# Patient Record
Sex: FEMALE | Race: WHITE | NOT HISPANIC OR LATINO | Employment: OTHER | URBAN - METROPOLITAN AREA
[De-identification: names, ages, dates, MRNs, and addresses within clinical notes are randomized per-mention and may not be internally consistent; named-entity substitution may affect disease eponyms.]

---

## 2017-08-03 ENCOUNTER — LAB VISIT (OUTPATIENT)
Dept: LAB | Facility: OTHER | Age: 27
End: 2017-08-03
Attending: OBSTETRICS & GYNECOLOGY
Payer: COMMERCIAL

## 2017-08-03 ENCOUNTER — OFFICE VISIT (OUTPATIENT)
Dept: OBSTETRICS AND GYNECOLOGY | Facility: CLINIC | Age: 27
End: 2017-08-03
Attending: OBSTETRICS & GYNECOLOGY
Payer: COMMERCIAL

## 2017-08-03 VITALS
BODY MASS INDEX: 29.45 KG/M2 | DIASTOLIC BLOOD PRESSURE: 68 MMHG | WEIGHT: 172.5 LBS | HEIGHT: 64 IN | SYSTOLIC BLOOD PRESSURE: 116 MMHG

## 2017-08-03 DIAGNOSIS — N93.8 DUB (DYSFUNCTIONAL UTERINE BLEEDING): ICD-10-CM

## 2017-08-03 DIAGNOSIS — Z12.4 SCREENING FOR CERVICAL CANCER: ICD-10-CM

## 2017-08-03 DIAGNOSIS — Z01.419 ENCOUNTER FOR GYNECOLOGICAL EXAMINATION: Primary | ICD-10-CM

## 2017-08-03 LAB
BASOPHILS # BLD AUTO: 0.01 K/UL
BASOPHILS NFR BLD: 0.2 %
DIFFERENTIAL METHOD: NORMAL
EOSINOPHIL # BLD AUTO: 0.1 K/UL
EOSINOPHIL NFR BLD: 1.6 %
ERYTHROCYTE [DISTWIDTH] IN BLOOD BY AUTOMATED COUNT: 13.4 %
HCT VFR BLD AUTO: 41.2 %
HGB BLD-MCNC: 13.6 G/DL
LYMPHOCYTES # BLD AUTO: 1.9 K/UL
LYMPHOCYTES NFR BLD: 33.5 %
MCH RBC QN AUTO: 28.2 PG
MCHC RBC AUTO-ENTMCNC: 33 G/DL
MCV RBC AUTO: 85 FL
MONOCYTES # BLD AUTO: 0.5 K/UL
MONOCYTES NFR BLD: 8.5 %
NEUTROPHILS # BLD AUTO: 3.3 K/UL
NEUTROPHILS NFR BLD: 56 %
PLATELET # BLD AUTO: 222 K/UL
PMV BLD AUTO: 11.2 FL
RBC # BLD AUTO: 4.83 M/UL
TSH SERPL DL<=0.005 MIU/L-ACNC: 1.38 UIU/ML
WBC # BLD AUTO: 5.79 K/UL

## 2017-08-03 PROCEDURE — 85025 COMPLETE CBC W/AUTO DIFF WBC: CPT

## 2017-08-03 PROCEDURE — 99999 PR PBB SHADOW E&M-EST. PATIENT-LVL III: CPT | Mod: PBBFAC,,, | Performed by: OBSTETRICS & GYNECOLOGY

## 2017-08-03 PROCEDURE — 36415 COLL VENOUS BLD VENIPUNCTURE: CPT

## 2017-08-03 PROCEDURE — 99395 PREV VISIT EST AGE 18-39: CPT | Mod: S$GLB,,, | Performed by: OBSTETRICS & GYNECOLOGY

## 2017-08-03 PROCEDURE — 88175 CYTOPATH C/V AUTO FLUID REDO: CPT

## 2017-08-03 PROCEDURE — 84443 ASSAY THYROID STIM HORMONE: CPT

## 2017-08-03 NOTE — PROGRESS NOTES
Subjective:       Patient ID: Dana Sanz is a 27 y.o. female.    Chief Complaint:  Well Woman (last pap negative 13, pt c/o heavy menses and spotting through out the month for the last 3-4 months, not currently taking any contraceptives )      History of Present Illness.  Dana Sanz is a 27 y.o. female.  She has no breast or urinary symptoms.  She has no oligomenorrhea, bleeding betweeen menses, postcoital bleeding, dysmenorrhea, pelvic pain, dyspareunia, vaginal dryness, vaginal discharge, or sexual complaints.  Her periods have been heavy the last 3-4 months and she is not on OCPs because her  had a vasectomy.    GYN & OB History  Patient's last menstrual period was 07/15/2017 (approximate).   Date of Last Pap: 8/3/2017  Gardasil: Yes    OB History    Para Term  AB Living   3 3 3     3   SAB TAB Ectopic Multiple Live Births           3      # Outcome Date GA Lbr Fabien/2nd Weight Sex Delivery Anes PTL Lv   3 Term 16 38w0d  3.657 kg (8 lb 1 oz) M CS-LTranv Spinal N THANG   2 Term 14 37w0d  3.062 kg (6 lb 12 oz) M CS-LTranv Spinal  THANG   1 Term 13 38w0d  3.856 kg (8 lb 8 oz) M CS-Unspec Spinal  THANG      Obstetric Comments   Age at menarche 12       History reviewed. No pertinent past medical history.  Past Surgical History:   Procedure Laterality Date    APPENDECTOMY  2008     SECTION      x 3    TONSILLECTOMY       Family History   Problem Relation Age of Onset    Hypertension Mother     Liver cancer Maternal Grandmother     Lung cancer Maternal Grandfather     Hypertension Brother     No Known Problems Sister     No Known Problems Brother     Ovarian cancer Neg Hx     Colon cancer Neg Hx     Breast cancer Neg Hx     Diabetes Neg Hx     Stroke Neg Hx      Social History   Substance Use Topics    Smoking status: Never Smoker    Smokeless tobacco: Never Used    Alcohol use Yes      Comment: occasional       Current Outpatient Prescriptions:  "    levonorgestrel (CAIT) 14 mcg/24 hour (3 years) IUD, 1 each by Intrauterine route once., Disp: 1 Intra Uterine Device, Rfl: 0  No current facility-administered medications for this visit.     Review of patient's allergies indicates:  No Known Allergies    Review of Systems  Review of Systems   Constitutional: Negative for fatigue.   HENT: Negative for trouble swallowing.    Eyes: Negative for visual disturbance.   Respiratory: Negative for cough and shortness of breath.    Cardiovascular: Negative for chest pain.   Gastrointestinal: Negative for abdominal distention, abdominal pain, blood in stool, nausea and vomiting.   Genitourinary: Negative for difficulty urinating, dyspareunia, dysuria, flank pain, frequency, hematuria, pelvic pain, urgency, vaginal bleeding, vaginal discharge and vaginal pain.   Musculoskeletal: Negative for arthralgias.   Skin: Negative for rash.   Neurological: Negative for dizziness and headaches.   Psychiatric/Behavioral: Negative for sleep disturbance. The patient is not nervous/anxious.         Objective:     Vitals:    08/03/17 0837   BP: 116/68   Weight: 78.3 kg (172 lb 8.2 oz)   Height: 5' 4" (1.626 m)   PainSc:   2     Body mass index is 29.61 kg/m².      Physical Exam:   Constitutional: She is oriented to person, place, and time. Vital signs are normal. She appears well-developed and well-nourished.    HENT:   Head: Normocephalic.     Neck: Normal range of motion. No thyromegaly present.     Pulmonary/Chest: Right breast exhibits no mass, no nipple discharge, no skin change, no tenderness and no swelling. Left breast exhibits no mass, no nipple discharge, no skin change, no tenderness and no swelling. Breasts are symmetrical.        Abdominal: Soft. Normal appearance and bowel sounds are normal. She exhibits no distension. There is no tenderness.     Genitourinary: Vagina normal and uterus normal. Pelvic exam was performed with patient supine. There is no rash, tenderness, " lesion or injury on the right labia. There is no rash, tenderness, lesion or injury on the left labia. Cervix is normal. Right adnexum displays no mass, no tenderness and no fullness. Left adnexum displays no mass, no tenderness and no fullness. No erythema in the vagina. No vaginal discharge found. Cervix exhibits no motion tenderness and no discharge.           Musculoskeletal: Normal range of motion.      Lymphadenopathy:        Right: No inguinal and no supraclavicular adenopathy present.        Left: No inguinal and no supraclavicular adenopathy present.    Neurological: She is alert and oriented to person, place, and time.    Skin: Skin is warm and dry.    Psychiatric: She has a normal mood and affect.        Assessment/ Plan:     Encounter for gynecological examination    DUB (dysfunctional uterine bleeding)  -     CBC auto differential; Future; Expected date: 08/03/2017  -     TSH; Future; Expected date: 08/03/2017  -     Discontinue: levonorgestrel 14 mcg/24 hour (3 years) IUD 14 mcg; 1 Intra Uterine Device (14 mcg total) by Intrauterine route one time.  -     levonorgestrel (CAIT) 14 mcg/24 hour (3 years) IUD; 1 each by Intrauterine route once.  Dispense: 1 Intra Uterine Device; Refill: 0    Screening for cervical cancer  -     Liquid-based pap smear, screening        Routine pap smears.  Self breast exam  Diet and exercise discussed.  Contraception reviewed/discussed.  Follow-up with me in 1 year.

## 2017-08-04 DIAGNOSIS — N93.8 DUB (DYSFUNCTIONAL UTERINE BLEEDING): ICD-10-CM

## 2017-08-07 ENCOUNTER — TELEPHONE (OUTPATIENT)
Dept: PHARMACY | Facility: CLINIC | Age: 27
End: 2017-08-07

## 2017-08-07 NOTE — TELEPHONE ENCOUNTER
LVM-Hello this is [facility] specialty pharmacy, we have received an order for Jennifer from your provider and will contact you once a benefits investigation is complete with copay information, to set up pickup or shipment, and [facility] consultation.  feel free to give us a call with  any questions prior to hearing back from us at [phone]. thank you!_SANDY 8/7/17

## 2017-08-07 NOTE — TELEPHONE ENCOUNTER
Left voicemail for pt to call back  Wanted to inform pt that her Jennifer is not covered through her pharmacy benefits and that it is being checked under her medical benefits. Once that is complete we will call her with those results and if she chooses to get the Sklya she has to schedule at the [facility] location.

## 2017-08-22 ENCOUNTER — TELEPHONE (OUTPATIENT)
Dept: OBSTETRICS AND GYNECOLOGY | Facility: CLINIC | Age: 27
End: 2017-08-22

## 2017-08-24 ENCOUNTER — PATIENT MESSAGE (OUTPATIENT)
Dept: OBSTETRICS AND GYNECOLOGY | Facility: CLINIC | Age: 27
End: 2017-08-24

## 2017-08-25 ENCOUNTER — PATIENT MESSAGE (OUTPATIENT)
Dept: OBSTETRICS AND GYNECOLOGY | Facility: CLINIC | Age: 27
End: 2017-08-25

## 2017-08-31 ENCOUNTER — OFFICE VISIT (OUTPATIENT)
Dept: OBSTETRICS AND GYNECOLOGY | Facility: CLINIC | Age: 27
End: 2017-08-31
Attending: OBSTETRICS & GYNECOLOGY
Payer: COMMERCIAL

## 2017-08-31 VITALS
HEIGHT: 64 IN | SYSTOLIC BLOOD PRESSURE: 122 MMHG | DIASTOLIC BLOOD PRESSURE: 70 MMHG | BODY MASS INDEX: 29.4 KG/M2 | WEIGHT: 172.19 LBS

## 2017-08-31 DIAGNOSIS — N94.6 DYSMENORRHEA: ICD-10-CM

## 2017-08-31 DIAGNOSIS — N92.0 MENORRHAGIA WITH REGULAR CYCLE: Primary | ICD-10-CM

## 2017-08-31 PROCEDURE — 3008F BODY MASS INDEX DOCD: CPT | Mod: S$GLB,,, | Performed by: OBSTETRICS & GYNECOLOGY

## 2017-08-31 PROCEDURE — 99999 PR PBB SHADOW E&M-EST. PATIENT-LVL II: CPT | Mod: PBBFAC,,, | Performed by: OBSTETRICS & GYNECOLOGY

## 2017-08-31 PROCEDURE — 99213 OFFICE O/P EST LOW 20 MIN: CPT | Mod: S$GLB,,, | Performed by: OBSTETRICS & GYNECOLOGY

## 2017-08-31 NOTE — PROGRESS NOTES
Subjective:      Dana Sanz is a 27 y.o. female who presents for contraception counseling and to discuss heavy periods.  The last 3 periods lasted 5 days but were heavy, soaking through super tampons and with clots.  She also says her cramps are increasing in intensity to 7/10.  She also has had cramps in RLQ and LLQ even when not on period.  The patient is sexually active and her  has a vasectomy.  She says multiple different pills have made her emotional.  Pertinent past medical history: none.  Lab Results   Component Value Date    WBC 5.79 2017    HGB 13.6 2017    HCT 41.2 2017    MCV 85 2017     2017     Lab Results   Component Value Date    TSH 1.383 2017       Menstrual History:  OB History      Para Term  AB Living    3 3 3     3    SAB TAB Ectopic Multiple Live Births            3        Obstetric Comments    Age at menarche 12         Menarche age: 12  Patient's last menstrual period was 2017 (exact date).       History reviewed. No pertinent past medical history.  Past Surgical History:   Procedure Laterality Date    APPENDECTOMY  2008     SECTION      x 3    TONSILLECTOMY  1992     Social History   Substance Use Topics    Smoking status: Never Smoker    Smokeless tobacco: Never Used    Alcohol use Yes      Comment: occasional     Family History   Problem Relation Age of Onset    Hypertension Mother     Liver cancer Maternal Grandmother     Lung cancer Maternal Grandfather     Hypertension Brother     No Known Problems Sister     No Known Problems Brother     Ovarian cancer Neg Hx     Colon cancer Neg Hx     Breast cancer Neg Hx     Diabetes Neg Hx     Stroke Neg Hx      OB History    Para Term  AB Living   3 3 3     3   SAB TAB Ectopic Multiple Live Births           3      # Outcome Date GA Lbr Fabien/2nd Weight Sex Delivery Anes PTL Lv   3 Term 16 38w0d  3.657 kg (8 lb 1 oz) M CS-LTranv  "Spinal N THANG   2 Term 05/11/14 37w0d  3.062 kg (6 lb 12 oz) M CS-LTranv Spinal  THANG   1 Term 07/24/13 38w0d  3.856 kg (8 lb 8 oz) M CS-Unspec Spinal  THANG      Obstetric Comments   Age at menarche 12       Review of Systems:  General: No fever, chills, fatigue or weight loss.  Chest: No chest pain, shortness of breath, or palpitations.  Breast: No pain, masses, or nipple discharge.  Vulva: No pain, lesions, or itching.  Vagina: No relaxation, pain, itching, discharge, or lesions.  Abdomen: No pain, nausea, vomiting, diarrhea, or constipation.  Urinary: No incontinence, nocturia, frequency, or dysuria.  Extremities:  No leg cramps, edema, or calf pain.  Neurologic: No headaches, dizziness, or visual changes.    Vitals:  Vitals:    08/31/17 1535   BP: 122/70   Weight: 78.1 kg (172 lb 2.9 oz)   Height: 5' 4" (1.626 m)   PainSc: 0-No pain     Body mass index is 29.55 kg/m².    Assessment:    27 y.o.with menorrhagia and dysmenorrhea with a h/o moodiness on multiple OCPs -  has vasectomy    Plan:   Contraceptive options reviewed including oral contraceptives, Ortho Evra, Nuvaring, Nexplanon, Mirena, and Jennifer.  Risks/benefits/side effects of each option discussed.  The patient is unsure of the plan because she would only be using for menorrhagia and dysmenorrhea.  We discussed Lysteda also.  Will do pelvic u/s and then she will decide.  I spent 15 minutes face to face with the patient today.  "

## 2017-08-31 NOTE — PROGRESS NOTES
Subjective:       Patient ID: Dana Sanz is a 27 y.o. female.    Chief Complaint:  Contraception (pt would like to discuss contraceptive options)      History of Present Illness.  Dana Sanz is a 27 y.o. female.  She has no breast or urinary symptoms.  She has no menorrhagia, oligomenorrhea, bleeding betweeen menses, postcoital bleeding, dysmenorrhea, pelvic pain, dyspareunia, vaginal dryness, vaginal discharge, or sexual complaints.    GYN & OB History  Patient's last menstrual period was 2017 (exact date).   Date of Last Pap: 2017  Gardasil:***    OB History    Para Term  AB Living   3 3 3     3   SAB TAB Ectopic Multiple Live Births           3      # Outcome Date GA Lbr Fabien/2nd Weight Sex Delivery Anes PTL Lv   3 Term 16 38w0d  3.657 kg (8 lb 1 oz) M CS-LTranv Spinal N THANG   2 Term 14 37w0d  3.062 kg (6 lb 12 oz) M CS-LTranv Spinal  THANG   1 Term 13 38w0d  3.856 kg (8 lb 8 oz) M CS-Unspec Spinal  THANG      Obstetric Comments   Age at menarche 12       History reviewed. No pertinent past medical history.  Past Surgical History:   Procedure Laterality Date    APPENDECTOMY       SECTION      x 3    TONSILLECTOMY       Family History   Problem Relation Age of Onset    Hypertension Mother     Liver cancer Maternal Grandmother     Lung cancer Maternal Grandfather     Hypertension Brother     No Known Problems Sister     No Known Problems Brother     Ovarian cancer Neg Hx     Colon cancer Neg Hx     Breast cancer Neg Hx     Diabetes Neg Hx     Stroke Neg Hx      Social History   Substance Use Topics    Smoking status: Never Smoker    Smokeless tobacco: Never Used    Alcohol use Yes      Comment: occasional     No current outpatient prescriptions on file.    Review of patient's allergies indicates:  No Known Allergies    Review of Systems  Review of Systems   Constitutional: Negative for fatigue.   HENT: Negative for trouble swallowing.   "  Eyes: Negative for visual disturbance.   Respiratory: Negative for cough and shortness of breath.    Cardiovascular: Negative for chest pain.   Gastrointestinal: Negative for abdominal distention, abdominal pain, blood in stool, nausea and vomiting.   Genitourinary: Negative for difficulty urinating, dyspareunia, dysuria, flank pain, frequency, hematuria, pelvic pain, urgency, vaginal bleeding, vaginal discharge and vaginal pain.   Musculoskeletal: Negative for arthralgias.   Skin: Negative for rash.   Neurological: Negative for dizziness and headaches.   Psychiatric/Behavioral: Negative for sleep disturbance. The patient is not nervous/anxious.         Objective:     Vitals:    08/31/17 1535   BP: 122/70   Weight: 78.1 kg (172 lb 2.9 oz)   Height: 5' 4" (1.626 m)   PainSc: 0-No pain     Body mass index is 29.55 kg/m².      Physical Exam     Assessment/ Plan:     There are no diagnoses linked to this encounter.    Routine pap smears.  Self breast exam  Diet and exercise discussed.  Contraception reviewed/discussed.  STD testing discussed and ***.  Follow-up with me in ***.  "

## 2017-10-05 ENCOUNTER — OFFICE VISIT (OUTPATIENT)
Dept: OBSTETRICS AND GYNECOLOGY | Facility: CLINIC | Age: 27
End: 2017-10-05
Payer: COMMERCIAL

## 2017-10-05 VITALS — DIASTOLIC BLOOD PRESSURE: 84 MMHG | HEIGHT: 64 IN | SYSTOLIC BLOOD PRESSURE: 126 MMHG

## 2017-10-05 DIAGNOSIS — N92.0 MENORRHAGIA WITH REGULAR CYCLE: Primary | ICD-10-CM

## 2017-10-05 DIAGNOSIS — N94.6 DYSMENORRHEA: ICD-10-CM

## 2017-10-05 PROCEDURE — 99999 PR PBB SHADOW E&M-EST. PATIENT-LVL II: CPT | Mod: PBBFAC,,, | Performed by: OBSTETRICS & GYNECOLOGY

## 2017-10-05 PROCEDURE — 99213 OFFICE O/P EST LOW 20 MIN: CPT | Mod: S$GLB,,, | Performed by: OBSTETRICS & GYNECOLOGY

## 2017-10-05 RX ORDER — DROSPIRENONE AND ETHINYL ESTRADIOL 0.02-3(28)
1 KIT ORAL DAILY
Qty: 28 TABLET | Refills: 12 | Status: SHIPPED | OUTPATIENT
Start: 2017-10-05 | End: 2018-02-22

## 2017-10-05 NOTE — PROGRESS NOTES
History of Present Illness:  Patient is a 27 y.o. female who complains of menorrhagia and dysmenorrhea.  The last visit she reported the following:  Her last 3 periods lasted 5 days but were heavy, soaking through super tampons and with clots.  Her cramps were a 7/10 on the pain scale.  She also has had cramps in RLQ and LLQ even when not on period.  The patient is sexually active and her  has a vasectomy.  She says multiple different pills have made her emotional. She tried sailaja in the past and thinks she did OK.  She has 3 kids and is working full-time.  She really feels that the moodiness when on the pill is due to the medication.   Pertinent past medical history: none.     Lab Results   Component Value Date    TSH 1.383 2017       Lab Results   Component Value Date    WBC 5.79 2017    HGB 13.6 2017    HCT 41.2 2017    MCV 85 2017     2017      Patient's last menstrual period was 2017 (approximate).    History reviewed. No pertinent past medical history.  Past Surgical History:   Procedure Laterality Date    APPENDECTOMY  2008     SECTION      x 3    TONSILLECTOMY  1992     Family History   Problem Relation Age of Onset    Hypertension Mother     Liver cancer Maternal Grandmother     Lung cancer Maternal Grandfather     Hypertension Brother     No Known Problems Sister     No Known Problems Brother     Ovarian cancer Neg Hx     Colon cancer Neg Hx     Breast cancer Neg Hx     Diabetes Neg Hx     Stroke Neg Hx      Social History     Social History    Marital status:      Spouse name: N/A    Number of children: N/A    Years of education: N/A     Social History Main Topics    Smoking status: Never Smoker    Smokeless tobacco: Never Used    Alcohol use Yes      Comment: occasional    Drug use: No    Sexual activity: Yes     Partners: Male     Birth control/ protection: Partner-Vasectomy      Comment: :  Justyn     Other  "Topics Concern    None     Social History Narrative    None     Review of patient's allergies indicates:  No Known Allergies  No current outpatient prescriptions on file prior to visit.     No current facility-administered medications on file prior to visit.        Review of Systems:  General: No fever, chills, fatigue or weight loss.  Chest: No chest pain, shortness of breath, or palpitations.  Breast: No pain, masses, or nipple discharge.  Vulva: No pain, lesions, or itching.  Vagina: No relaxation, pain, itching, discharge, or lesions.  Abdomen: No pain, nausea, vomiting, diarrhea, or constipation.  Urinary: No incontinence, nocturia, frequency, or dysuria.  Extremities:  No leg cramps, edema, or calf pain.  Neurologic: No headaches, dizziness, or visual changes.    Vitals:    10/05/17 1539   BP: 126/84   Height: 5' 4" (1.626 m)   PainSc: 0-No pain     There is no height or weight on file to calculate BMI.    Assessment and Plan:  Menorrhagia with regular cycle  -     drospirenone-ethinyl estradiol (SHAYY) 3-0.02 mg per tablet; Take 1 tablet by mouth once daily.  Dispense: 28 tablet; Refill: 12    Dysmenorrhea  -     drospirenone-ethinyl estradiol (SHAYY) 3-0.02 mg per tablet; Take 1 tablet by mouth once daily.  Dispense: 28 tablet; Refill: 12    Discussed Lysteda.  We had a long discussion about options including OCP. OE, Nuvaring, IUD, and Nexplanon.  Risks/benefits discussed.  She will try Gianvi and give it 4 months.  We also discussed adding a medication for her mood if it worsens on contraception.  I spent 15 minutes face to face with the patient today.  "

## 2018-02-22 ENCOUNTER — OFFICE VISIT (OUTPATIENT)
Dept: OBSTETRICS AND GYNECOLOGY | Facility: CLINIC | Age: 28
End: 2018-02-22
Payer: COMMERCIAL

## 2018-02-22 VITALS
HEIGHT: 64 IN | BODY MASS INDEX: 27.96 KG/M2 | SYSTOLIC BLOOD PRESSURE: 110 MMHG | DIASTOLIC BLOOD PRESSURE: 80 MMHG | WEIGHT: 163.81 LBS

## 2018-02-22 DIAGNOSIS — N93.8 DUB (DYSFUNCTIONAL UTERINE BLEEDING): ICD-10-CM

## 2018-02-22 DIAGNOSIS — N94.6 DYSMENORRHEA: ICD-10-CM

## 2018-02-22 DIAGNOSIS — F43.23 SITUATIONAL MIXED ANXIETY AND DEPRESSIVE DISORDER: Primary | ICD-10-CM

## 2018-02-22 PROBLEM — N92.0 MENORRHAGIA WITH REGULAR CYCLE: Status: RESOLVED | Noted: 2017-08-31 | Resolved: 2018-02-22

## 2018-02-22 PROCEDURE — 3008F BODY MASS INDEX DOCD: CPT | Mod: S$GLB,,, | Performed by: OBSTETRICS & GYNECOLOGY

## 2018-02-22 PROCEDURE — 99999 PR PBB SHADOW E&M-EST. PATIENT-LVL III: CPT | Mod: PBBFAC,,, | Performed by: OBSTETRICS & GYNECOLOGY

## 2018-02-22 PROCEDURE — 99213 OFFICE O/P EST LOW 20 MIN: CPT | Mod: S$GLB,,, | Performed by: OBSTETRICS & GYNECOLOGY

## 2018-02-22 RX ORDER — DROSPIRENONE AND ETHINYL ESTRADIOL TABLETS 0.02-3(28)
1 KIT ORAL DAILY
Qty: 30 TABLET | Refills: 9 | Status: SHIPPED | OUTPATIENT
Start: 2018-02-22 | End: 2019-03-14

## 2018-02-22 RX ORDER — VENLAFAXINE HYDROCHLORIDE 37.5 MG/1
37.5 CAPSULE, EXTENDED RELEASE ORAL DAILY
Qty: 30 CAPSULE | Refills: 2 | Status: SHIPPED | OUTPATIENT
Start: 2018-02-22 | End: 2019-03-14

## 2018-02-22 RX ORDER — DROSPIRENONE AND ETHINYL ESTRADIOL TABLETS 0.02-3(28)
1 KIT ORAL DAILY
Refills: 12 | COMMUNITY
Start: 2018-02-05 | End: 2018-02-22 | Stop reason: SDUPTHER

## 2018-02-22 NOTE — PROGRESS NOTES
History of Present Illness:  Patient is a 28 y.o. female here for follow-up of heavy, painful periods. The last visit she reported the following:  Her last 3 periods lasted 5 days but were heavy, soaking through super tampons and with clots.  Her cramps were a 7/10 on the pain scale.  She also has had cramps in RLQ and LLQ even when not on period.  The patient is sexually active and her  has a vasectomy.  She says multiple different pills have made her emotional. She tried sailaja in the past and thinks she did OK.  She has 3 kids and is working full-time. Today, she says her cramps are much better on Gianvi.  She has only a very light period, spotting monthly. She is feeling anxious today and crying because her mother had a heart attack in January.  Her mom and sister both have anxiety.  No SI or HI.           Lab Results   Component Value Date     TSH 1.383 08/03/2017               Lab Results   Component Value Date     WBC 5.79 08/03/2017     HGB 13.6 08/03/2017     HCT 41.2 08/03/2017     MCV 85 08/03/2017      08/03/2017     Patient's last menstrual period was 02/17/2018.    No visits with results within 3 Month(s) from this visit.   Latest known visit with results is:   Lab Visit on 08/03/2017   Component Date Value Ref Range Status    WBC 08/03/2017 5.79  3.90 - 12.70 K/uL Final    RBC 08/03/2017 4.83  4.00 - 5.40 M/uL Final    Hemoglobin 08/03/2017 13.6  12.0 - 16.0 g/dL Final    Hematocrit 08/03/2017 41.2  37.0 - 48.5 % Final    MCV 08/03/2017 85  82 - 98 fL Final    MCH 08/03/2017 28.2  27.0 - 31.0 pg Final    MCHC 08/03/2017 33.0  32.0 - 36.0 g/dL Final    RDW 08/03/2017 13.4  11.5 - 14.5 % Final    Platelets 08/03/2017 222  150 - 350 K/uL Final    MPV 08/03/2017 11.2  9.2 - 12.9 fL Final    Gran # (ANC) 08/03/2017 3.3  1.8 - 7.7 K/uL Final    Lymph # 08/03/2017 1.9  1.0 - 4.8 K/uL Final    Mono # 08/03/2017 0.5  0.3 - 1.0 K/uL Final    Eos # 08/03/2017 0.1  0.0 - 0.5 K/uL Final     Baso # 2017 0.01  0.00 - 0.20 K/uL Final    Gran% 2017 56.0  38.0 - 73.0 % Final    Lymph% 2017 33.5  18.0 - 48.0 % Final    Mono% 2017 8.5  4.0 - 15.0 % Final    Eosinophil% 2017 1.6  0.0 - 8.0 % Final    Basophil% 2017 0.2  0.0 - 1.9 % Final    Differential Method 2017 Automated   Final    TSH 2017 1.383  0.400 - 4.000 uIU/mL Final       No past medical history on file.  Past Surgical History:   Procedure Laterality Date    APPENDECTOMY  2008     SECTION      x 3    TONSILLECTOMY  1992     Family History   Problem Relation Age of Onset    Hypertension Mother     Liver cancer Maternal Grandmother     Lung cancer Maternal Grandfather     Hypertension Brother     No Known Problems Sister     No Known Problems Brother     Ovarian cancer Neg Hx     Colon cancer Neg Hx     Breast cancer Neg Hx     Diabetes Neg Hx     Stroke Neg Hx      Social History     Social History    Marital status:      Spouse name: N/A    Number of children: N/A    Years of education: N/A     Social History Main Topics    Smoking status: Never Smoker    Smokeless tobacco: Never Used    Alcohol use Yes      Comment: occasional    Drug use: No    Sexual activity: Yes     Partners: Male     Birth control/ protection: Partner-Vasectomy      Comment: :  Justyn     Other Topics Concern    None     Social History Narrative    None     Review of patient's allergies indicates:  No Known Allergies  Current Outpatient Prescriptions on File Prior to Visit   Medication Sig Dispense Refill    [DISCONTINUED] drospirenone-ethinyl estradiol (SHAYY) 3-0.02 mg per tablet Take 1 tablet by mouth once daily. 28 tablet 12     No current facility-administered medications on file prior to visit.        Review of Systems:  General: No fever, chills, fatigue or weight loss.  Chest: No chest pain, shortness of breath, or palpitations.  Breast: No pain, masses, or nipple  "discharge.  Vulva: No pain, lesions, or itching.  Vagina: No relaxation, pain, itching, discharge, or lesions.  Abdomen: No pain, nausea, vomiting, diarrhea, or constipation.  Urinary: No incontinence, nocturia, frequency, or dysuria.  Extremities:  No leg cramps, edema, or calf pain.  Neurologic: No headaches, dizziness, or visual changes.    Vitals:    02/22/18 0834   BP: 110/80   Weight: 74.3 kg (163 lb 12.8 oz)   Height: 5' 4" (1.626 m)   PainSc: 0-No pain     Body mass index is 28.12 kg/m².     Assessment and Plan:  Situational mixed anxiety and depressive disorder  -     venlafaxine (EFFEXOR XR) 37.5 MG 24 hr capsule; Take 1 capsule (37.5 mg total) by mouth once daily.  Dispense: 30 capsule; Refill: 2    DUB (dysfunctional uterine bleeding)  -     LORYNA, 28, 3-0.02 mg per tablet; Take 1 tablet by mouth once daily.  Dispense: 30 tablet; Refill: 9    Dysmenorrhea  -     LORYNA, 28, 3-0.02 mg per tablet; Take 1 tablet by mouth once daily.  Dispense: 30 tablet; Refill: 9    Walk 30 minutes/day.  Yoga and meditation discussed.  Follow-up 1 month to check on effexor.  Continue Shreya.  I spent 15 minutes face to face with the patient today.  "

## 2019-03-14 ENCOUNTER — OFFICE VISIT (OUTPATIENT)
Dept: OBSTETRICS AND GYNECOLOGY | Facility: CLINIC | Age: 29
End: 2019-03-14
Payer: COMMERCIAL

## 2019-03-14 VITALS
WEIGHT: 165.25 LBS | SYSTOLIC BLOOD PRESSURE: 122 MMHG | BODY MASS INDEX: 28.21 KG/M2 | HEIGHT: 64 IN | DIASTOLIC BLOOD PRESSURE: 72 MMHG

## 2019-03-14 DIAGNOSIS — L70.9 ACNE, UNSPECIFIED ACNE TYPE: Primary | ICD-10-CM

## 2019-03-14 DIAGNOSIS — L65.9 HAIR LOSS: ICD-10-CM

## 2019-03-14 DIAGNOSIS — Z01.411 ENCOUNTER FOR GYNECOLOGICAL EXAMINATION (GENERAL) (ROUTINE) WITH ABNORMAL FINDINGS: ICD-10-CM

## 2019-03-14 DIAGNOSIS — Z00.00 PERIODIC HEALTH ASSESSMENT, GENERAL SCREENING, ADULT: ICD-10-CM

## 2019-03-14 PROCEDURE — 99395 PR PREVENTIVE VISIT,EST,18-39: ICD-10-PCS | Mod: S$GLB,,, | Performed by: OBSTETRICS & GYNECOLOGY

## 2019-03-14 PROCEDURE — 99999 PR PBB SHADOW E&M-EST. PATIENT-LVL III: CPT | Mod: PBBFAC,,, | Performed by: OBSTETRICS & GYNECOLOGY

## 2019-03-14 PROCEDURE — 99999 PR PBB SHADOW E&M-EST. PATIENT-LVL III: ICD-10-PCS | Mod: PBBFAC,,, | Performed by: OBSTETRICS & GYNECOLOGY

## 2019-03-14 PROCEDURE — 99395 PREV VISIT EST AGE 18-39: CPT | Mod: S$GLB,,, | Performed by: OBSTETRICS & GYNECOLOGY

## 2019-03-14 NOTE — PROGRESS NOTES
Subjective:       Patient ID: Dana Sanz is a 29 y.o. female.    Chief Complaint:  Well Woman      History of Present Illness.  Dana Sanz is a 29 y.o. female.  She has no breast or urinary symptoms.  She has no menorrhagia, oligomenorrhea, bleeding betweeen menses, postcoital bleeding, pelvic pain, dyspareunia, vaginal dryness, vaginal discharge, or sexual complaints.  She stopped OCPs in July.  Doesn't like how hormones make her feel.  She complains of acne which she has never had before on her face as well as hair loss on her scalp.  Her periods come monthly, last 5-7 days, and have mild cramps.  Not stressed now.  Not taking Effexor.  Quit job and lives on [facility].  She still has mild dysmenorrhea.  It responded somewhat to Aleve and Advil.    GYN & OB History  Patient's last menstrual period was 2019 (exact date).   Date of Last Pap: 2017  Normal  Gardasil: Yes    OB History    Para Term  AB Living   3 3 3     3   SAB TAB Ectopic Multiple Live Births           3      # Outcome Date GA Lbr Fabien/2nd Weight Sex Delivery Anes PTL Lv   3 Term 16 38w0d  3.657 kg (8 lb 1 oz) M CS-LTranv Spinal N THANG   2 Term 14 37w0d  3.062 kg (6 lb 12 oz) M CS-LTranv Spinal  THANG   1 Term 13 38w0d  3.856 kg (8 lb 8 oz) M CS-Unspec Spinal  THANG      Obstetric Comments   Age at menarche 12       History reviewed. No pertinent past medical history.  Past Surgical History:   Procedure Laterality Date    APPENDECTOMY  2008     SECTION      x 3    TONSILLECTOMY  1992     Family History   Problem Relation Age of Onset    Hypertension Mother     Coronary artery disease Mother     Liver cancer Maternal Grandmother     Lung cancer Maternal Grandfather     Hypertension Brother     No Known Problems Sister     No Known Problems Brother     Ovarian cancer Neg Hx     Colon cancer Neg Hx     Breast cancer Neg Hx     Diabetes Neg Hx     Stroke Neg Hx      Social History  "    Tobacco Use    Smoking status: Never Smoker    Smokeless tobacco: Never Used   Substance Use Topics    Alcohol use: Yes     Comment: occasional    Drug use: No       Current Outpatient Medications:     prenatal vit calc,iron,folic (PRENATAL VITAMIN ORAL), Take by mouth., Disp: , Rfl:     Review of patient's allergies indicates:  No Known Allergies    Review of Systems  Review of Systems   Constitutional: Negative for fatigue.   HENT: Negative for trouble swallowing.    Eyes: Negative for visual disturbance.   Respiratory: Negative for cough and shortness of breath.    Cardiovascular: Negative for chest pain.   Gastrointestinal: Negative for abdominal distention, abdominal pain, blood in stool, nausea and vomiting.   Genitourinary: Negative for difficulty urinating, dyspareunia, dysuria, flank pain, frequency, hematuria, pelvic pain, urgency, vaginal bleeding, vaginal discharge and vaginal pain.   Musculoskeletal: Negative for arthralgias.   Skin: Negative for rash.   Neurological: Negative for dizziness and headaches.   Psychiatric/Behavioral: Negative for sleep disturbance. The patient is not nervous/anxious.         Objective:     Vitals:    03/14/19 1332   BP: 122/72   Weight: 75 kg (165 lb 3.8 oz)   Height: 5' 4" (1.626 m)   PainSc: 0-No pain     Body mass index is 28.36 kg/m².      Physical Exam:   Constitutional: She is oriented to person, place, and time. Vital signs are normal. She appears well-developed and well-nourished.    HENT:   Head: Normocephalic.     Neck: Normal range of motion. No thyromegaly present.     Pulmonary/Chest: Right breast exhibits no mass, no nipple discharge, no skin change, no tenderness and no swelling. Left breast exhibits no mass, no nipple discharge, no skin change, no tenderness and no swelling. Breasts are symmetrical.        Abdominal: Soft. Normal appearance and bowel sounds are normal. She exhibits no distension. There is no tenderness.     Genitourinary: Vagina " normal and uterus normal. Pelvic exam was performed with patient supine. There is no rash, tenderness, lesion or injury on the right labia. There is no rash, tenderness, lesion or injury on the left labia. Cervix is normal. Right adnexum displays no mass, no tenderness and no fullness. Left adnexum displays no mass, no tenderness and no fullness. No erythema in the vagina. No vaginal discharge found. Cervix exhibits no motion tenderness and no discharge.           Musculoskeletal: Normal range of motion.      Lymphadenopathy:        Right: No inguinal and no supraclavicular adenopathy present.        Left: No inguinal and no supraclavicular adenopathy present.    Neurological: She is alert and oriented to person, place, and time.    Skin: Skin is warm and dry.    Psychiatric: She has a normal mood and affect.        Assessment/ Plan:     Acne, unspecified acne type  -     Follicle stimulating hormone; Future  -     Luteinizing hormone; Future  -     TSH; Future  -     Comprehensive metabolic panel; Future  -     Hemoglobin A1c; Future  -     Lipid panel; Future  -     17-Hydroxyprogesterone; Future  -     Testosterone, free; Future  -     Testosterone; Future  -     DHEA-sulfate; Future  -     US Pelvis Comp with Transvag NON-OB (xpd; Future; Expected date: 03/14/2019    Hair loss  -     Follicle stimulating hormone; Future  -     Luteinizing hormone; Future  -     TSH; Future  -     CBC auto differential; Future; Expected date: 03/14/2019  -     17-Hydroxyprogesterone; Future  -     Testosterone, free; Future  -     Testosterone; Future  -     DHEA-sulfate; Future  -     US Pelvis Comp with Transvag NON-OB (xpd; Future; Expected date: 03/14/2019    Periodic health assessment, general screening, adult  -     TSH; Future  -     CBC auto differential; Future; Expected date: 03/14/2019  -     Comprehensive metabolic panel; Future  -     Hemoglobin A1c; Future  -     Lipid panel; Future      We discussed her many  reasons for hair loss, including stress thyroid disease, rapid weight loss, PCOS, etc  Will do labs and ultrasound to rule out PCOS.  We will discuss contraception after her labs and ultrasound were done.  Routine pap smears.  Self breast exam  Diet and exercise discussed.  Contraception reviewed/discussed.  Follow-up with me for ultrasound and plan.

## 2019-03-15 ENCOUNTER — TELEPHONE (OUTPATIENT)
Dept: OBSTETRICS AND GYNECOLOGY | Facility: CLINIC | Age: 29
End: 2019-03-15

## 2019-03-15 DIAGNOSIS — Z00.00 PERIODIC HEALTH ASSESSMENT, GENERAL SCREENING, ADULT: ICD-10-CM

## 2019-03-15 DIAGNOSIS — L65.9 HAIR LOSS: ICD-10-CM

## 2019-03-15 DIAGNOSIS — L70.9 ACNE, UNSPECIFIED ACNE TYPE: Primary | ICD-10-CM

## 2019-03-15 NOTE — TELEPHONE ENCOUNTER
Spoke with pt and she can only go to [facility]. Reentered the labs so she can have them done there on Monday. Informed pt that she can still keep her appointment with Dr. Patel on the 27th. Pt verbalized understanding.

## 2019-03-15 NOTE — TELEPHONE ENCOUNTER
Pt preferred lab is [facility]. Pt is going to try to go to a [facility] on Monday to have labs drawn. Pt wanted me to let you know because she was unsure if you wanted her to keep her appt for u/s on 03/27 or if she would need to move it back.

## 2019-03-22 LAB
17OHP SERPL-MCNC: 103 NG/DL
ALBUMIN SERPL-MCNC: 4.3 G/DL (ref 3.5–5.5)
ALBUMIN/GLOB SERPL: 1.6 {RATIO} (ref 1.2–2.2)
ALP SERPL-CCNC: 73 IU/L (ref 39–117)
ALT SERPL-CCNC: 14 IU/L (ref 0–32)
AST SERPL-CCNC: 12 IU/L (ref 0–40)
BASOPHILS # BLD AUTO: 0 X10E3/UL (ref 0–0.2)
BASOPHILS NFR BLD AUTO: 0 %
BILIRUB SERPL-MCNC: 0.2 MG/DL (ref 0–1.2)
BUN SERPL-MCNC: 12 MG/DL (ref 6–20)
BUN/CREAT SERPL: 18 (ref 9–23)
CALCIUM SERPL-MCNC: 9.3 MG/DL (ref 8.7–10.2)
CHLORIDE SERPL-SCNC: 105 MMOL/L (ref 96–106)
CHOLEST SERPL-MCNC: 159 MG/DL (ref 100–199)
CO2 SERPL-SCNC: 23 MMOL/L (ref 20–29)
CREAT SERPL-MCNC: 0.68 MG/DL (ref 0.57–1)
DHEA-S SERPL-MCNC: 248.6 UG/DL (ref 84.8–378)
EOSINOPHIL # BLD AUTO: 0.1 X10E3/UL (ref 0–0.4)
EOSINOPHIL NFR BLD AUTO: 2 %
ERYTHROCYTE [DISTWIDTH] IN BLOOD BY AUTOMATED COUNT: 14.1 % (ref 12.3–15.4)
FSH SERPL-ACNC: 2.9 MIU/ML
GLOBULIN SER CALC-MCNC: 2.7 G/DL (ref 1.5–4.5)
GLUCOSE SERPL-MCNC: 92 MG/DL (ref 65–99)
HBA1C MFR BLD: 5.2 % (ref 4.8–5.6)
HCT VFR BLD AUTO: 38.3 % (ref 34–46.6)
HDLC SERPL-MCNC: 58 MG/DL
HGB BLD-MCNC: 12.8 G/DL (ref 11.1–15.9)
IMM GRANULOCYTES # BLD AUTO: 0 X10E3/UL (ref 0–0.1)
IMM GRANULOCYTES NFR BLD AUTO: 0 %
LDLC SERPL CALC-MCNC: 89 MG/DL (ref 0–99)
LH SERPL-ACNC: 6 MIU/ML
LYMPHOCYTES # BLD AUTO: 2 X10E3/UL (ref 0.7–3.1)
LYMPHOCYTES NFR BLD AUTO: 30 %
MCH RBC QN AUTO: 28.5 PG (ref 26.6–33)
MCHC RBC AUTO-ENTMCNC: 33.4 G/DL (ref 31.5–35.7)
MCV RBC AUTO: 85 FL (ref 79–97)
MONOCYTES # BLD AUTO: 0.6 X10E3/UL (ref 0.1–0.9)
MONOCYTES NFR BLD AUTO: 9 %
NEUTROPHILS # BLD AUTO: 4 X10E3/UL (ref 1.4–7)
NEUTROPHILS NFR BLD AUTO: 59 %
PLATELET # BLD AUTO: 244 X10E3/UL (ref 150–379)
POTASSIUM SERPL-SCNC: 4.4 MMOL/L (ref 3.5–5.2)
PROT SERPL-MCNC: 7 G/DL (ref 6–8.5)
RBC # BLD AUTO: 4.49 X10E6/UL (ref 3.77–5.28)
SODIUM SERPL-SCNC: 141 MMOL/L (ref 134–144)
TESTOST FREE SERPL-MCNC: 2.1 PG/ML (ref 0–4.2)
TESTOST SERPL-MCNC: 37 NG/DL (ref 8–48)
TRIGL SERPL-MCNC: 59 MG/DL (ref 0–149)
TSH SERPL DL<=0.005 MIU/L-ACNC: 1.8 UIU/ML (ref 0.45–4.5)
VLDLC SERPL CALC-MCNC: 12 MG/DL (ref 5–40)
WBC # BLD AUTO: 6.7 X10E3/UL (ref 3.4–10.8)

## 2019-03-27 ENCOUNTER — OFFICE VISIT (OUTPATIENT)
Dept: OBSTETRICS AND GYNECOLOGY | Facility: CLINIC | Age: 29
End: 2019-03-27
Payer: COMMERCIAL

## 2019-03-27 VITALS — BODY MASS INDEX: 28.36 KG/M2 | HEIGHT: 64 IN

## 2019-03-27 DIAGNOSIS — E28.2 PCOS (POLYCYSTIC OVARIAN SYNDROME): Primary | ICD-10-CM

## 2019-03-27 PROCEDURE — 99214 PR OFFICE/OUTPT VISIT, EST, LEVL IV, 30-39 MIN: ICD-10-PCS | Mod: S$GLB,,, | Performed by: OBSTETRICS & GYNECOLOGY

## 2019-03-27 PROCEDURE — 99214 OFFICE O/P EST MOD 30 MIN: CPT | Mod: S$GLB,,, | Performed by: OBSTETRICS & GYNECOLOGY

## 2019-03-27 PROCEDURE — 3008F PR BODY MASS INDEX (BMI) DOCUMENTED: ICD-10-PCS | Mod: CPTII,S$GLB,, | Performed by: OBSTETRICS & GYNECOLOGY

## 2019-03-27 PROCEDURE — 99999 PR PBB SHADOW E&M-EST. PATIENT-LVL II: ICD-10-PCS | Mod: PBBFAC,,, | Performed by: OBSTETRICS & GYNECOLOGY

## 2019-03-27 PROCEDURE — 99999 PR PBB SHADOW E&M-EST. PATIENT-LVL II: CPT | Mod: PBBFAC,,, | Performed by: OBSTETRICS & GYNECOLOGY

## 2019-03-27 PROCEDURE — 3008F BODY MASS INDEX DOCD: CPT | Mod: CPTII,S$GLB,, | Performed by: OBSTETRICS & GYNECOLOGY

## 2019-03-27 RX ORDER — DROSPIRENONE AND ETHINYL ESTRADIOL 0.02-3(28)
1 KIT ORAL DAILY
Qty: 28 TABLET | Refills: 12 | Status: SHIPPED | OUTPATIENT
Start: 2019-03-27 | End: 2019-04-26

## 2019-03-27 RX ORDER — SPIRONOLACTONE 25 MG/1
25 TABLET ORAL 2 TIMES DAILY
Qty: 60 TABLET | Refills: 11 | Status: SHIPPED | OUTPATIENT
Start: 2019-03-27 | End: 2020-03-05 | Stop reason: SDUPTHER

## 2019-03-27 RX ORDER — METFORMIN HYDROCHLORIDE 500 MG/1
TABLET, EXTENDED RELEASE ORAL
Qty: 120 TABLET | Refills: 11 | Status: SHIPPED | OUTPATIENT
Start: 2019-03-27 | End: 2020-05-24 | Stop reason: SDUPTHER

## 2019-03-27 NOTE — PROGRESS NOTES
Dana Sanz is a 29 y.o. female who presents for follow-up of PCOS.  At her last visit on 3/13/19, she said she stopped OCPs in July.  Doesn't like how hormones make her feel.  She complains of acne which she has never had before on her face as well as hair loss on her scalp.  Her periods come monthly, last 5-7 days, and have mild cramps.  Not stressed now.  Not taking Effexor.  Quit job and lives on [facility].  She still has mild dysmenorrhea.  It responded somewhat to Aleve and Advil.  Today on U/S, she has PCOS appearing ovaries with an endometrial stripe of 6 mm  The following labs are consistent with PCOS elevated LH:FSH ratio and free testosterone of 2.1.    Telephone on 03/15/2019   Component Date Value Ref Range Status    DHEA-SO4 03/18/2019 248.6  84.8 - 378.0 ug/dL Final    Testosterone 03/18/2019 37  8 - 48 ng/dL Final    Testosterone, Free 03/18/2019 2.1  0.0 - 4.2 pg/mL Final    17-OH Progesterone LCMS 03/18/2019 103  ng/dL Final    Cholesterol 03/18/2019 159  100 - 199 mg/dL Final    Triglycerides 03/18/2019 59  0 - 149 mg/dL Final    HDL 03/18/2019 58  >39 mg/dL Final    VLDL Cholesterol [place] 03/18/2019 12  5 - 40 mg/dL Final    LDL Calculated 03/18/2019 89  0 - 99 mg/dL Final    Hemoglobin A1C 03/18/2019 5.2  4.8 - 5.6 % Final    Glucose 03/18/2019 92  65 - 99 mg/dL Final    BUN, Bld 03/18/2019 12  6 - 20 mg/dL Final    Creatinine 03/18/2019 0.68  0.57 - 1.00 mg/dL Final    eGFR if non African American 03/18/2019 119  >59 mL/min/1.73 Final    eGFR if African American 03/18/2019 137  >59 mL/min/1.73 Final    BUN/Creatinine Ratio 03/18/2019 18  9 - 23 Final    Sodium 03/18/2019 141  134 - 144 mmol/L Final    Potassium 03/18/2019 4.4  3.5 - 5.2 mmol/L Final    Chloride 03/18/2019 105  96 - 106 mmol/L Final    CO2 03/18/2019 23  20 - 29 mmol/L Final    Calcium 03/18/2019 9.3  8.7 - 10.2 mg/dL Final    Total Protein 03/18/2019 7.0  6.0 - 8.5 g/dL Final    Albumin 03/18/2019 4.3   3.5 - 5.5 g/dL Final    Globulin, Total 2019 2.7  1.5 - 4.5 g/dL Final    Albumin/Globulin Ratio 2019 1.6  1.2 - 2.2 Final    Total Bilirubin 2019 0.2  0.0 - 1.2 mg/dL Final    Alkaline Phosphatase 2019 73  39 - 117 IU/L Final    AST 2019 12  0 - 40 IU/L Final    ALT 2019 14  0 - 32 IU/L Final    WBC 2019 6.7  3.4 - 10.8 x10E3/uL Final    RBC 2019 4.49  3.77 - 5.28 x10E6/uL Final    Hemoglobin 2019 12.8  11.1 - 15.9 g/dL Final    Hematocrit 2019 38.3  34.0 - 46.6 % Final    MCV 2019 85  79 - 97 fL Final    MCH 2019 28.5  26.6 - 33.0 pg Final    MCHC 2019 33.4  31.5 - 35.7 g/dL Final    RDW 2019 14.1  12.3 - 15.4 % Final    Platelets 2019 244  150 - 379 x10E3/uL Final    Neutrophils 2019 59  Not Estab. % Final    Lymph% 2019 30  Not Estab. % Final    Mono% 2019 9  Not Estab. % Final    Eosinophil% 2019 2  Not Estab. % Final    Basophil% 2019 0  Not Estab. % Final    Neutrophils Absolute 2019 4.0  1.4 - 7.0 x10E3/uL Final    Lymph # 2019 2.0  0.7 - 3.1 x10E3/uL Final    Mono # 2019 0.6  0.1 - 0.9 x10E3/uL Final    Eos # 2019 0.1  0.0 - 0.4 x10E3/uL Final    Baso # 2019 0.0  0.0 - 0.2 x10E3/uL Final    Immature Granulocytes 2019 0  Not Estab. % Final    Immature Grans (Abs) 2019 0.0  0.0 - 0.1 x10E3/uL Final    TSH 2019 1.800  0.450 - 4.500 uIU/mL Final    LH 2019 6.0  mIU/mL Final    FSH 2019 2.9  mIU/mL Final       Menstrual History:  OB History        3    Para   3    Term   3            AB        Living   3       SAB        TAB        Ectopic        Multiple        Live Births   3           Obstetric Comments   Age at menarche 12            Patient's last menstrual period was 2019 (exact date).       Past Medical History:   Diagnosis Date    Relies on partner vasectomy for contraception   "    Past Surgical History:   Procedure Laterality Date    APPENDECTOMY  2008     SECTION  , 2013, 2016    x 3    TONSILLECTOMY  1992     Social History     Tobacco Use    Smoking status: Never Smoker    Smokeless tobacco: Never Used   Substance Use Topics    Alcohol use: Yes     Comment: occasional    Drug use: No     Family History   Problem Relation Age of Onset    Hypertension Mother     Coronary artery disease Mother     Liver cancer Maternal Grandmother     Lung cancer Maternal Grandfather     Hypertension Brother     No Known Problems Sister     No Known Problems Brother     Ovarian cancer Neg Hx     Colon cancer Neg Hx     Breast cancer Neg Hx     Diabetes Neg Hx     Stroke Neg Hx      OB History    Para Term  AB Living   3 3 3     3   SAB TAB Ectopic Multiple Live Births           3      # Outcome Date GA Lbr Fabien/2nd Weight Sex Delivery Anes PTL Lv   3 Term 16 38w0d  3.657 kg (8 lb 1 oz) M CS-LTranv Spinal N THANG      Complications: PROM (premature rupture of membranes), Previous  section   2 Term 14 37w0d  3.062 kg (6 lb 12 oz) M CS-LTranv Spinal  THANG      Complications: Hypertension affecting pregnancy   1 Term 13 38w0d  3.856 kg (8 lb 8 oz) M CS-LTranv Spinal  THANG      Complications: Failure to Progress in Second Stage, Pre-eclampsia      Obstetric Comments   Age at menarche 12       Review of Systems:  General: No fever, chills, fatigue or weight loss.  Chest: No chest pain, shortness of breath, or palpitations.  Breast: No pain, masses, or nipple discharge.  Vulva: No pain, lesions, or itching.  Vagina: No relaxation, pain, itching, discharge, or lesions.  Abdomen: No pain, nausea, vomiting, diarrhea, or constipation.  Urinary: No incontinence, nocturia, frequency, or dysuria.  Extremities:  No leg cramps, edema, or calf pain.  Neurologic: No headaches, dizziness, or visual changes.     Objective:      Ht 5' 4" (1.626 m)   LMP " 03/21/2019 (Exact Date)   BMI 28.36 kg/m²     Assessment & Plan   PCOS (polycystic ovarian syndrome)  -     spironolactone (ALDACTONE) 25 MG tablet; Take 1 tablet (25 mg total) by mouth 2 (two) times daily.  Dispense: 60 tablet; Refill: 11  -     drospirenone-ethinyl estradiol (SHAYY) 3-0.02 mg per tablet; Take 1 tablet by mouth once daily.  Dispense: 28 tablet; Refill: 12  -     metFORMIN (GLUCOPHAGE XR) 500 MG 24 hr tablet; 1 tab at breakfast x 1 month, 1 tab at breakfast & dinner month 2, 2 tabs at breakfast & 1 with dinner month 3, 2 tabs at breakfast & dinner  Dispense: 120 tablet; Refill: 11      Discussed at length the pathophysiology of PCOS and treatment options  Shayy mg QD  Metformin  mg and titrate to 500 mg BID over 1 month.  Spironolactone 25 mg BID.  Discussed thyroid optimization  30 minutes of moderate intensity aerobic exercise 5 days a week and strength training exercises 2-3 times a week.  Low carbohydrate diet  I spent 25 minutes face to face with the patient today.  Follow-up in 4 months.

## 2019-04-15 ENCOUNTER — PATIENT MESSAGE (OUTPATIENT)
Dept: OBSTETRICS AND GYNECOLOGY | Facility: CLINIC | Age: 29
End: 2019-04-15

## 2019-04-16 ENCOUNTER — OFFICE VISIT (OUTPATIENT)
Dept: OBSTETRICS AND GYNECOLOGY | Facility: CLINIC | Age: 29
End: 2019-04-16
Payer: COMMERCIAL

## 2019-04-16 DIAGNOSIS — R30.0 DYSURIA: Primary | ICD-10-CM

## 2019-04-16 DIAGNOSIS — R35.0 INCREASED FREQUENCY OF URINATION: ICD-10-CM

## 2019-04-16 DIAGNOSIS — R39.15 URGENCY OF URINATION: ICD-10-CM

## 2019-04-16 LAB
BILIRUB SERPL-MCNC: NORMAL MG/DL
BLOOD URINE, POC: NORMAL
COLOR, POC UA: YELLOW
GLUCOSE UR QL STRIP: NORMAL
KETONES UR QL STRIP: NORMAL
LEUKOCYTE ESTERASE URINE, POC: NORMAL
NITRITE, POC UA: NORMAL
PH, POC UA: 7
PROTEIN, POC: NORMAL
SPECIFIC GRAVITY, POC UA: 1010
UROBILINOGEN, POC UA: NORMAL

## 2019-04-16 PROCEDURE — 3008F PR BODY MASS INDEX (BMI) DOCUMENTED: ICD-10-PCS | Mod: CPTII,S$GLB,, | Performed by: NURSE PRACTITIONER

## 2019-04-16 PROCEDURE — 3008F BODY MASS INDEX DOCD: CPT | Mod: CPTII,S$GLB,, | Performed by: NURSE PRACTITIONER

## 2019-04-16 PROCEDURE — 99999 PR PBB SHADOW E&M-EST. PATIENT-LVL III: ICD-10-PCS | Mod: PBBFAC,,, | Performed by: NURSE PRACTITIONER

## 2019-04-16 PROCEDURE — 99214 OFFICE O/P EST MOD 30 MIN: CPT | Mod: S$GLB,,, | Performed by: NURSE PRACTITIONER

## 2019-04-16 PROCEDURE — 99214 PR OFFICE/OUTPT VISIT, EST, LEVL IV, 30-39 MIN: ICD-10-PCS | Mod: S$GLB,,, | Performed by: NURSE PRACTITIONER

## 2019-04-16 PROCEDURE — 81002 POCT URINE DIPSTICK WITHOUT MICROSCOPE: ICD-10-PCS | Mod: S$GLB,,, | Performed by: NURSE PRACTITIONER

## 2019-04-16 PROCEDURE — 99999 PR PBB SHADOW E&M-EST. PATIENT-LVL III: CPT | Mod: PBBFAC,,, | Performed by: NURSE PRACTITIONER

## 2019-04-16 PROCEDURE — 81002 URINALYSIS NONAUTO W/O SCOPE: CPT | Mod: S$GLB,,, | Performed by: NURSE PRACTITIONER

## 2019-04-16 RX ORDER — NITROFURANTOIN 25; 75 MG/1; MG/1
100 CAPSULE ORAL 2 TIMES DAILY
Qty: 14 CAPSULE | Refills: 0 | Status: SHIPPED | OUTPATIENT
Start: 2019-04-16 | End: 2019-04-23

## 2019-04-21 LAB
BACTERIA UR CULT: ABNORMAL
BACTERIA UR CULT: ABNORMAL
OTHER ANTIBIOTIC SUSC ISLT: ABNORMAL

## 2019-04-30 VITALS
SYSTOLIC BLOOD PRESSURE: 122 MMHG | WEIGHT: 166.44 LBS | DIASTOLIC BLOOD PRESSURE: 70 MMHG | BODY MASS INDEX: 28.42 KG/M2 | HEIGHT: 64 IN

## 2019-04-30 NOTE — PROGRESS NOTES
Chief Complaint:    Chief Complaint   Patient presents with    Dysuria     Dr Patel   last pap 8/3/17 neg         (Dr. Patel patient)    Last Pap: 8/8/2017     HPI:      Dana Sanz is a 29 y.o. female who presents today with dysuria, nocturia and urgency for the past week or so, since she started taking Spironolactone as prescribed for PCOS.  Symptoms are worsening as far as keeping her up all throughout the night.  The patient denies flank pain, fever, nausea, vomiting, and hematuria. She denies frequent or recurrent UTIs.     She does not complain of vaginal discharge.  She is sexually active.   She declines STD screening today.    LMP:  Patient's last menstrual period was 03/21/2019 (exact date).  She is currently using oral contraceptives (estrogen/progesterone), vasectomy for contraception.    ROS:     GENERAL:  Feeling well overall.  No fever or chills..  ABDOMEN:  No abdominal pain, constipation, diarrhea, nausea, vomiting or rectal bleeding.  No CVA tenderness.  GENITOURINARY:  See HPI.    Physical Exam:      Vitals:    04/16/19 1351   BP: 122/70     Body mass index is 28.57 kg/m².    APPEARANCE:  Well developed, well nourished female in no acute distress..  ABDOMEN:  (--) suprapubic tenderness.  MUSCULOSKELETAL:  No CVA tenderness.  PELVIC: Normal external genitalia without lesions.  Normal hair distribution.  Adequate perineal body, normal urethral meatus.  Vagina moist and well rugated without lesions or discharge.  Cervix pink, without lesions, discharge or tenderness.  No significant cystocele or rectocele.  Bimanual exam shows uterus to be normal size, regular, mobile and non-tender.  Adnexa without masses or tenderness.    Results:      Urine dipstick shows:   negative for all components.           (See full results under LABS in Epic)    Assessment/Plan:     Dysuria  -     nitrofurantoin, macrocrystal-monohydrate, (MACROBID) 100 MG capsule; Take 1 capsule (100 mg total) by mouth 2 (two) times  daily. for 7 days  Dispense: 14 capsule; Refill: 0  -     POCT urine dipstick without microscope  -     Urine culture    Urgency of urination  -     nitrofurantoin, macrocrystal-monohydrate, (MACROBID) 100 MG capsule; Take 1 capsule (100 mg total) by mouth 2 (two) times daily. for 7 days  Dispense: 14 capsule; Refill: 0    Increased frequency of urination  -     nitrofurantoin, macrocrystal-monohydrate, (MACROBID) 100 MG capsule; Take 1 capsule (100 mg total) by mouth 2 (two) times daily. for 7 days  Dispense: 14 capsule; Refill: 0      Counseling:     * Discussed UTI prevention including perineal hygiene, increasing fluids and avoiding caffeine and alcohol, and pelvic rest until symptoms resolve.  * Also discussed signs of pyelonephritis and to go to the ED if develops fever, chills, n/v, back pain, worsening dyuria, hematuria.    * Use of the [employer] Patient Portal discussed and encouraged during today's visit.   * Patient aware she will be notified of any results from today's visit once they have been reviewed by Provider, either via her [facility] patient portal, or telephone call.    Follow-up as needed if no resolution of symptoms.

## 2019-05-01 ENCOUNTER — PATIENT MESSAGE (OUTPATIENT)
Dept: OBSTETRICS AND GYNECOLOGY | Facility: CLINIC | Age: 29
End: 2019-05-01

## 2019-07-23 ENCOUNTER — LAB VISIT (OUTPATIENT)
Dept: LAB | Facility: HOSPITAL | Age: 29
End: 2019-07-23
Attending: OBSTETRICS & GYNECOLOGY
Payer: COMMERCIAL

## 2019-07-23 ENCOUNTER — OFFICE VISIT (OUTPATIENT)
Dept: OBSTETRICS AND GYNECOLOGY | Facility: CLINIC | Age: 29
End: 2019-07-23
Payer: COMMERCIAL

## 2019-07-23 VITALS
DIASTOLIC BLOOD PRESSURE: 82 MMHG | WEIGHT: 167.44 LBS | BODY MASS INDEX: 28.59 KG/M2 | SYSTOLIC BLOOD PRESSURE: 126 MMHG | HEIGHT: 64 IN

## 2019-07-23 DIAGNOSIS — E28.2 PCOS (POLYCYSTIC OVARIAN SYNDROME): ICD-10-CM

## 2019-07-23 DIAGNOSIS — Z13.21 ENCOUNTER FOR VITAMIN DEFICIENCY SCREENING: Primary | ICD-10-CM

## 2019-07-23 DIAGNOSIS — Z13.21 ENCOUNTER FOR VITAMIN DEFICIENCY SCREENING: ICD-10-CM

## 2019-07-23 DIAGNOSIS — R10.32 LLQ PAIN: ICD-10-CM

## 2019-07-23 PROBLEM — L70.9 ACNE: Status: RESOLVED | Noted: 2019-03-14 | Resolved: 2019-07-23

## 2019-07-23 PROBLEM — L65.9 HAIR LOSS: Status: RESOLVED | Noted: 2019-03-14 | Resolved: 2019-07-23

## 2019-07-23 LAB
25(OH)D3+25(OH)D2 SERPL-MCNC: 46 NG/ML (ref 30–96)
BILIRUB SERPL-MCNC: NORMAL MG/DL
BLOOD URINE, POC: NORMAL
COLOR, POC UA: YELLOW
GLUCOSE UR QL STRIP: NORMAL
KETONES UR QL STRIP: NORMAL
LEUKOCYTE ESTERASE URINE, POC: NORMAL
NITRITE, POC UA: NORMAL
PH, POC UA: 7
PROTEIN, POC: NORMAL
SPECIFIC GRAVITY, POC UA: 1
UROBILINOGEN, POC UA: NORMAL
VIT B12 SERPL-MCNC: 600 PG/ML (ref 210–950)

## 2019-07-23 PROCEDURE — 99214 PR OFFICE/OUTPT VISIT, EST, LEVL IV, 30-39 MIN: ICD-10-PCS | Mod: 25,,, | Performed by: OBSTETRICS & GYNECOLOGY

## 2019-07-23 PROCEDURE — 82607 VITAMIN B-12: CPT

## 2019-07-23 PROCEDURE — 3008F BODY MASS INDEX DOCD: CPT | Mod: CPTII,S$GLB,, | Performed by: OBSTETRICS & GYNECOLOGY

## 2019-07-23 PROCEDURE — 3008F PR BODY MASS INDEX (BMI) DOCUMENTED: ICD-10-PCS | Mod: CPTII,S$GLB,, | Performed by: OBSTETRICS & GYNECOLOGY

## 2019-07-23 PROCEDURE — 99999 PR PBB SHADOW E&M-EST. PATIENT-LVL III: CPT | Mod: PBBFAC,,, | Performed by: OBSTETRICS & GYNECOLOGY

## 2019-07-23 PROCEDURE — 81002 URINALYSIS NONAUTO W/O SCOPE: CPT | Mod: S$GLB,,, | Performed by: OBSTETRICS & GYNECOLOGY

## 2019-07-23 PROCEDURE — 81002 POCT URINE DIPSTICK WITHOUT MICROSCOPE: ICD-10-PCS | Mod: S$GLB,,, | Performed by: OBSTETRICS & GYNECOLOGY

## 2019-07-23 PROCEDURE — 82306 VITAMIN D 25 HYDROXY: CPT

## 2019-07-23 PROCEDURE — 99214 OFFICE O/P EST MOD 30 MIN: CPT | Mod: 25,,, | Performed by: OBSTETRICS & GYNECOLOGY

## 2019-07-23 PROCEDURE — 99999 PR PBB SHADOW E&M-EST. PATIENT-LVL III: ICD-10-PCS | Mod: PBBFAC,,, | Performed by: OBSTETRICS & GYNECOLOGY

## 2019-07-23 NOTE — PROGRESS NOTES
"Dana Sanz is a 29 y.o. female who presents for follow-up of PCOS. She is currently taking Sumi QD, Metformin  mg 2 tablets BID, and Spironolactone 25 mg 2 tablets daily.  She also takes Vitamin B12 and D daily but is unsure of dose.  She is having the following side effects: none.   The following symptoms have improved:  Hair loss and acne.  She has occasional break-outs but not bad.  She is not exercising.  She feels she eats "healthy", mainly meat and veggies , fruit, and salad.  She has been having LLQ pain for 3 months.  It worse at night.  No change with certain foods.  It's every day all day.  It's a 4/10.  Doesn't take medicine for it.  Normal bowel function and urination.  In monogamous relationship.  Declines STD testing.        Menstrual History:  OB History        3    Para   3    Term   3            AB        Living   3       SAB        TAB        Ectopic        Multiple        Live Births   3           Obstetric Comments   Age at menarche 12            Patient's last menstrual period was 2019 (approximate).       Past Medical History:   Diagnosis Date    Relies on partner vasectomy for contraception      Past Surgical History:   Procedure Laterality Date    APPENDECTOMY  2008     SECTION  , , 2016    x 3    TONSILLECTOMY  1992     Social History     Tobacco Use    Smoking status: Never Smoker    Smokeless tobacco: Never Used   Substance Use Topics    Alcohol use: Yes     Comment: occasional    Drug use: No     Family History   Problem Relation Age of Onset    Hypertension Mother     Coronary artery disease Mother     Liver cancer Maternal Grandmother     Lung cancer Maternal Grandfather     Hypertension Brother     No Known Problems Sister     No Known Problems Brother     Ovarian cancer Neg Hx     Colon cancer Neg Hx     Breast cancer Neg Hx     Diabetes Neg Hx     Stroke Neg Hx      OB History    Para Term  AB Living   3 " "3 3     3   SAB TAB Ectopic Multiple Live Births           3      # Outcome Date GA Lbr Fabien/2nd Weight Sex Delivery Anes PTL Lv   3 Term 16 38w0d  3.657 kg (8 lb 1 oz) M CS-LTranv Spinal N THANG      Complications: PROM (premature rupture of membranes), Previous  section   2 Term 14 37w0d  3.062 kg (6 lb 12 oz) M CS-LTranv Spinal  THANG      Complications: Hypertension affecting pregnancy   1 Term 13 38w0d  3.856 kg (8 lb 8 oz) M CS-LTranv Spinal  THANG      Complications: Failure to Progress in Second Stage, Pre-eclampsia      Obstetric Comments   Age at menarche 12       Review of Systems:  General: No fever, chills, fatigue or weight loss.  Chest: No chest pain, shortness of breath, or palpitations.  Breast: No pain, masses, or nipple discharge.  Vulva: No pain, lesions, or itching.  Vagina: No relaxation, pain, itching, discharge, or lesions.  Abdomen: No pain, nausea, vomiting, diarrhea, or constipation.  Urinary: No incontinence, nocturia, frequency, or dysuria.  Extremities:  No leg cramps, edema, or calf pain.  Neurologic: No headaches, dizziness, or visual changes.     Objective:     Vitals:    19 1011   BP: 126/82   Weight: 76 kg (167 lb 7 oz)   Height: 5' 4" (1.626 m)   PainSc:   4     Body mass index is 28.74 kg/m².    General:  Well developed, well nourished, and in no apparent distress.  HEENT:  Normal sclera.  No thyromegaly  External genitalia:  No lesions, erythema, rashes, or other abnormalities.  Urethra:  No lesions or discharge.  Vagina:  No lesions, discharge, or tenderness.   Cervix:  No lesions, discharge, or cervical motion tenderness.   Uterus:  Normal in size and shape.  Mobile and nontender.   Adnexa:  No masses or tenderness.    Assessment & Plan:   Encounter for vitamin deficiency screening  -     Vitamin B12; Future; Expected date: 2019  -     Vitamin D; Future; Expected date: 2019    PCOS (polycystic ovarian syndrome)  -     Ambulatory Referral to " [facility])  -     [facility] ([employer])  -     [facility] Patient Entered [facility])    Adult BMI 28.0-28.9 kg/sq m  -     Ambulatory Referral to [facility])  -     [facility] ([employer])  -     [facility] Patient Entered [facility] ([employer])    LLQ pain  -     POCT URINE DIPSTICK WITHOUT MICROSCOPE  -     US Pelvis Comp with Transvag NON-OB (xpd; Future; Expected date: 07/23/2019      Reviewed the pathophysiology of PCOS and treatment options  Continue:   Sumi QD   Metformin  mg 2 tablets BID.   Spironolactone 50 mg QD.   Vitamin D and B12  30 minutes of moderate intensity aerobic exercise 5 days a week and strength training exercises 2-3 times a week.  Low carbohydrate diet.  Referred to [facility]  If ultrasound is normal, recommend trying NSAIDs.  If is still a relief of left lower quadrant pain, recommend GI consult.  I spent 25 minutes face to face with the patient today.  Follow-up in 3 months.

## 2019-08-27 ENCOUNTER — PATIENT MESSAGE (OUTPATIENT)
Dept: OBSTETRICS AND GYNECOLOGY | Facility: CLINIC | Age: 29
End: 2019-08-27

## 2019-08-27 DIAGNOSIS — N83.202 LEFT OVARIAN CYST: Primary | ICD-10-CM

## 2019-10-24 ENCOUNTER — OFFICE VISIT (OUTPATIENT)
Dept: OBSTETRICS AND GYNECOLOGY | Facility: CLINIC | Age: 29
End: 2019-10-24
Payer: COMMERCIAL

## 2019-10-24 VITALS
SYSTOLIC BLOOD PRESSURE: 118 MMHG | WEIGHT: 167 LBS | HEIGHT: 64 IN | BODY MASS INDEX: 28.51 KG/M2 | DIASTOLIC BLOOD PRESSURE: 80 MMHG

## 2019-10-24 DIAGNOSIS — E28.2 PCOS (POLYCYSTIC OVARIAN SYNDROME): Primary | ICD-10-CM

## 2019-10-24 PROCEDURE — 99999 PR PBB SHADOW E&M-EST. PATIENT-LVL III: CPT | Mod: PBBFAC,,, | Performed by: OBSTETRICS & GYNECOLOGY

## 2019-10-24 PROCEDURE — 99214 PR OFFICE/OUTPT VISIT, EST, LEVL IV, 30-39 MIN: ICD-10-PCS | Mod: S$GLB,,, | Performed by: OBSTETRICS & GYNECOLOGY

## 2019-10-24 PROCEDURE — 3008F PR BODY MASS INDEX (BMI) DOCUMENTED: ICD-10-PCS | Mod: CPTII,S$GLB,, | Performed by: OBSTETRICS & GYNECOLOGY

## 2019-10-24 PROCEDURE — 99214 OFFICE O/P EST MOD 30 MIN: CPT | Mod: S$GLB,,, | Performed by: OBSTETRICS & GYNECOLOGY

## 2019-10-24 PROCEDURE — 3008F BODY MASS INDEX DOCD: CPT | Mod: CPTII,S$GLB,, | Performed by: OBSTETRICS & GYNECOLOGY

## 2019-10-24 PROCEDURE — 99999 PR PBB SHADOW E&M-EST. PATIENT-LVL III: ICD-10-PCS | Mod: PBBFAC,,, | Performed by: OBSTETRICS & GYNECOLOGY

## 2019-10-24 NOTE — PROGRESS NOTES
" Dana Sanz is a 29 y.o. female who presents for follow-up of PCOS and a left ovarian cyst.     At her last visit on 19, she said her hair loss and acne had improved.  She has occasional break-outs but not bad.  She was not exercising.  She feels she eats "healthy", mainly meat and veggies , fruit, and salad.  She has been having LLQ pain for 3 months.  It worse at night.  No change with certain foods.  It's every day all day.  It's a 4/10.  Doesn't take medicine for it.  Normal bowel function and urination.  In monogamous relationship.  Declined STD testing.     Today, her LLQ pain is dull and she mainly notices it at night,  Improving.  Cyst has resolved on U/S today.  She is currently taking Sumi QD, Metformin  mg 2 tablets BID, and Spironolactone 25 mg 2 tablets daily, Vitamin B12 1000 mcg QD, and Vitamin D 2000 IU daily. Runs/walks 3 days / week 5-6 miles.  She thinks she eats healthy.  Her main issue is an inability to lose weight.  She does not want to do [facility].    No visits with results within 3 Month(s) from this visit.   Latest known visit with results is:   Lab Visit on 2019   Component Date Value Ref Range Status    Vitamin B-12 2019 600  210 - 950 pg/mL Final    Vit D, 25-Hydroxy 2019 46  30 - 96 ng/mL Final       Menstrual History:  OB History        3    Para   3    Term   3            AB        Living   3       SAB        TAB        Ectopic        Multiple        Live Births   3           Obstetric Comments   Age at menarche 12            No LMP recorded (lmp unknown).       Past Medical History:   Diagnosis Date    Relies on partner vasectomy for contraception      Past Surgical History:   Procedure Laterality Date    APPENDECTOMY  2008     SECTION  , , 2016    x 3    TONSILLECTOMY  1992     Social History     Tobacco Use    Smoking status: Never Smoker    Smokeless tobacco: Never Used   Substance Use Topics    Alcohol " "use: Yes     Comment: occasional    Drug use: No     Family History   Problem Relation Age of Onset    Hypertension Mother     Coronary artery disease Mother     Liver cancer Maternal Grandmother     Lung cancer Maternal Grandfather     Hypertension Brother     No Known Problems Sister     No Known Problems Brother     Ovarian cancer Neg Hx     Colon cancer Neg Hx     Breast cancer Neg Hx     Diabetes Neg Hx     Stroke Neg Hx      OB History    Para Term  AB Living   3 3 3     3   SAB TAB Ectopic Multiple Live Births           3      # Outcome Date GA Lbr Fabien/2nd Weight Sex Delivery Anes PTL Lv   3 Term 16 38w0d  3.657 kg (8 lb 1 oz) M CS-LTranv Spinal N THANG      Complications: PROM (premature rupture of membranes), Previous  section   2 Term 14 37w0d  3.062 kg (6 lb 12 oz) M CS-LTranv Spinal  THANG      Complications: Hypertension affecting pregnancy   1 Term 13 38w0d  3.856 kg (8 lb 8 oz) M CS-LTranv Spinal  THANG      Complications: Failure to Progress in Second Stage, Pre-eclampsia      Obstetric Comments   Age at menarche 12       Review of Systems:  General: No fever, chills, fatigue or weight loss.  Chest: No chest pain, shortness of breath, or palpitations.  Breast: No pain, masses, or nipple discharge.  Vulva: No pain, lesions, or itching.  Vagina: No relaxation, pain, itching, discharge, or lesions.  Abdomen: No pain, nausea, vomiting, diarrhea, or constipation.  Urinary: No incontinence, nocturia, frequency, or dysuria.  Extremities:  No leg cramps, edema, or calf pain.  Neurologic: No headaches, dizziness, or visual changes.     Objective:     Vitals:    10/24/19 0910   BP: 118/80   Weight: 75.8 kg (167 lb)   Height: 5' 4" (1.626 m)   PainSc:   2     Body mass index is 28.67 kg/m².    Assessment & Plan:   PCOS (polycystic ovarian syndrome)      Reviewed the pathophysiology of PCOS and treatment options  Continue:   Sumi QD   Metformin  mg 2 tablets " BID.   Spironolactone 25 mg 2 tablets QD   Vitamin D3 2000 IU daily.   Vitamin B12 1000 mcg QD  Cinnamon and fish oil.  30 minutes of moderate intensity aerobic exercise 5 days a week and strength training exercises 2-3 times a week.  Low carbohydrate diet.  Recommend intermittent fasting.  The Obesity Code discussed.  F/U with gastroenterology for LLQ pain (Could be GI or adhesions related to c/s)  I spent 25 minutes face to face with the patient today.  Follow-up in 3 months.

## 2020-03-05 DIAGNOSIS — E28.2 PCOS (POLYCYSTIC OVARIAN SYNDROME): ICD-10-CM

## 2020-03-05 RX ORDER — SPIRONOLACTONE 25 MG/1
25 TABLET ORAL 2 TIMES DAILY
Qty: 60 TABLET | Refills: 0 | Status: SHIPPED | OUTPATIENT
Start: 2020-03-05 | End: 2020-03-12

## 2020-03-12 ENCOUNTER — OFFICE VISIT (OUTPATIENT)
Dept: OBSTETRICS AND GYNECOLOGY | Facility: CLINIC | Age: 30
End: 2020-03-12
Payer: COMMERCIAL

## 2020-03-12 VITALS
DIASTOLIC BLOOD PRESSURE: 84 MMHG | BODY MASS INDEX: 28.56 KG/M2 | HEIGHT: 64 IN | SYSTOLIC BLOOD PRESSURE: 110 MMHG | WEIGHT: 167.31 LBS

## 2020-03-12 DIAGNOSIS — E28.2 PCOS (POLYCYSTIC OVARIAN SYNDROME): Primary | ICD-10-CM

## 2020-03-12 PROCEDURE — 99999 PR PBB SHADOW E&M-EST. PATIENT-LVL III: ICD-10-PCS | Mod: PBBFAC,,, | Performed by: OBSTETRICS & GYNECOLOGY

## 2020-03-12 PROCEDURE — 3008F PR BODY MASS INDEX (BMI) DOCUMENTED: ICD-10-PCS | Mod: CPTII,S$GLB,, | Performed by: OBSTETRICS & GYNECOLOGY

## 2020-03-12 PROCEDURE — 99214 OFFICE O/P EST MOD 30 MIN: CPT | Mod: S$GLB,,, | Performed by: OBSTETRICS & GYNECOLOGY

## 2020-03-12 PROCEDURE — 3008F BODY MASS INDEX DOCD: CPT | Mod: CPTII,S$GLB,, | Performed by: OBSTETRICS & GYNECOLOGY

## 2020-03-12 PROCEDURE — 99999 PR PBB SHADOW E&M-EST. PATIENT-LVL III: CPT | Mod: PBBFAC,,, | Performed by: OBSTETRICS & GYNECOLOGY

## 2020-03-12 PROCEDURE — 99214 PR OFFICE/OUTPT VISIT, EST, LEVL IV, 30-39 MIN: ICD-10-PCS | Mod: S$GLB,,, | Performed by: OBSTETRICS & GYNECOLOGY

## 2020-03-12 RX ORDER — SPIRONOLACTONE 50 MG/1
50 TABLET, FILM COATED ORAL DAILY
Qty: 30 TABLET | Refills: 11 | Status: SHIPPED | OUTPATIENT
Start: 2020-03-12 | End: 2021-05-05

## 2020-03-12 NOTE — PROGRESS NOTES
" Dana Sanz is a 30 y.o. female who presents for follow-up of PCOS.  At her last visit on 10/24/19, she complained of dull LLQ pain mainly at night and improving.  Cyst had resolved on U/S.  She was taking Sumi QD, Metformin  mg 2 tablets BID, and Spironolactone 25 mg 2 tablets daily, Vitamin B12 1000 mcg QD, and Vitamin D 2000 IU daily. Runs/walks 3 days / week 5-6 miles.  She thought she was eating healthy.  Her main issue was an inability to lose weight.  She did not want to do [facility].     Plan on 10/24/19:  Continue:              Gianvi QD              Metformin  mg 2 tablets BID.              Spironolactone 25 mg 2 tablets QD              Vitamin D3 2000 IU daily              Vitamin B12 1000 mcg QD  Cinnamon and fish oil.  30 minutes of moderate intensity aerobic exercise 5 days a week and strength training exercises 2-3 times a week.  Low carbohydrate diet.  Recommend intermittent fasting.  The Obesity Code discussed.  F/U with gastroenterology for LLQ pain (Could be GI or adhesions related to c/s)    She is doing all of the above meds and supplements. She did not see GI.  Pain only occurs when she eats "unhealthy" things. She is having the following side effects: none.  She is cycling 3-4 days / week 10-12 miles.  She is not doing weight bearing exercises yet.  She has been doing intermittent fasting since October    No visits with results within 3 Month(s) from this visit.   Latest known visit with results is:   Lab Visit on 2019   Component Date Value Ref Range Status    Vitamin B-12 2019 600  210 - 950 pg/mL Final    Vit D, 25-Hydroxy 2019 46  30 - 96 ng/mL Final       Menstrual History:  OB History        3    Para   3    Term   3            AB        Living   3       SAB        TAB        Ectopic        Multiple        Live Births   3           Obstetric Comments   Age at menarche 12            No LMP recorded.       Past Medical History: "   Diagnosis Date    Relies on partner vasectomy for contraception      Past Surgical History:   Procedure Laterality Date    APPENDECTOMY  2008     SECTION  2011, 2013, 2016    x 3    TONSILLECTOMY  1992     Social History     Tobacco Use    Smoking status: Never Smoker    Smokeless tobacco: Never Used   Substance Use Topics    Alcohol use: Yes     Comment: occasional    Drug use: No     Family History   Problem Relation Age of Onset    Hypertension Mother     Coronary artery disease Mother     Liver cancer Maternal Grandmother     Lung cancer Maternal Grandfather     Hypertension Brother     No Known Problems Sister     No Known Problems Brother     Ovarian cancer Neg Hx     Colon cancer Neg Hx     Breast cancer Neg Hx     Diabetes Neg Hx     Stroke Neg Hx      OB History    Para Term  AB Living   3 3 3     3   SAB TAB Ectopic Multiple Live Births           3      # Outcome Date GA Lbr Fabien/2nd Weight Sex Delivery Anes PTL Lv   3 Term 16 38w0d  3.657 kg (8 lb 1 oz) M CS-LTranv Spinal N THANG      Complications: PROM (premature rupture of membranes), Previous  section   2 Term 14 37w0d  3.062 kg (6 lb 12 oz) M CS-LTranv Spinal  THANG      Complications: Hypertension affecting pregnancy   1 Term 13 38w0d  3.856 kg (8 lb 8 oz) M CS-LTranv Spinal  THANG      Complications: Failure to Progress in Second Stage, Pre-eclampsia      Obstetric Comments   Age at menarche 12       Review of Systems:  General: No fever, chills, fatigue or weight loss.  Chest: No chest pain, shortness of breath, or palpitations.  Breast: No pain, masses, or nipple discharge.  Vulva: No pain, lesions, or itching.  Vagina: No relaxation, pain, itching, discharge, or lesions.  Abdomen: No pain, nausea, vomiting, diarrhea, or constipation.  Urinary: No incontinence, nocturia, frequency, or dysuria.  Extremities:  No leg cramps, edema, or calf pain.  Neurologic: No headaches, dizziness, or  "visual changes.     Objective:     Vitals:    03/12/20 1047   BP: 110/84   Weight: 75.9 kg (167 lb 5.3 oz)   Height: 5' 4" (1.626 m)   PainSc: 0-No pain     Body mass index is 28.72 kg/m².    Assessment & Plan:   PCOS (polycystic ovarian syndrome)  -     spironolactone (ALDACTONE) 50 MG tablet; Take 1 tablet (50 mg total) by mouth once daily.  Dispense: 30 tablet; Refill: 11  -     Ambulatory referral/consult to Bariatric Medicine; Future; Expected date: 03/19/2020    BMI 28.0-28.9,adult  -     Ambulatory referral/consult to Bariatric Medicine; Future; Expected date: 03/19/2020      Reviewed the pathophysiology of PCOS and treatment options  Continue current meds and supplements  30 minutes of moderate intensity aerobic exercise 5 days a week and strength training exercises 2-3 times a week.  Low carbohydrate diet - recommend she get Dr. Xie's book on IF  I spent 25 minutes face to face with the patient today.  Follow-up in 3 months for annual and PCOS.    "

## 2020-05-14 ENCOUNTER — PATIENT MESSAGE (OUTPATIENT)
Dept: OBSTETRICS AND GYNECOLOGY | Facility: CLINIC | Age: 30
End: 2020-05-14

## 2020-05-14 DIAGNOSIS — F41.9 ANXIETY: Primary | ICD-10-CM

## 2020-05-14 RX ORDER — ESCITALOPRAM OXALATE 10 MG/1
10 TABLET ORAL DAILY
Qty: 30 TABLET | Refills: 2 | Status: SHIPPED | OUTPATIENT
Start: 2020-05-14 | End: 2020-07-30

## 2020-05-23 DIAGNOSIS — E28.2 PCOS (POLYCYSTIC OVARIAN SYNDROME): ICD-10-CM

## 2020-05-24 RX ORDER — METFORMIN HYDROCHLORIDE 500 MG/1
TABLET, EXTENDED RELEASE ORAL
Qty: 360 TABLET | Refills: 3 | Status: SHIPPED | OUTPATIENT
Start: 2020-05-24 | End: 2021-07-19

## 2020-05-26 ENCOUNTER — PATIENT MESSAGE (OUTPATIENT)
Dept: OBSTETRICS AND GYNECOLOGY | Facility: CLINIC | Age: 30
End: 2020-05-26

## 2020-05-26 NOTE — PROGRESS NOTES
The patient location is: [facility]  Chief complaint leading to consultation is: Anxiety    Visit type: audio only      15 minutes of total time spent on the encounter, which includes face to face time and non-face to face time preparing to see the patient (eg, review of tests), Obtaining and/or reviewing separately obtained history, Documenting clinical information in the electronic or other health record, Independently interpreting results (not separately reported) and communicating results to the patient/family/caregiver, or Care coordination (not separately reported).         Each patient to whom he or she provides medical services by telemedicine is:  (1) informed of the relationship between the physician and patient and the respective role of any other health care provider with respect to management of the patient; and (2) notified that he or she may decline to receive medical services by telemedicine and may withdraw from such care at any time.    Notes:     Spencer Sanz is a 30 y.o. female who presents for follow up of anxiety disorder. She started Lexapro.  Since then, she's having insomnia and night sweats.  She has no libido now either.  She was having crying spells and being very edgy with the kids.  Has been worse since COVID-19.  The Lexapro helped the anxiety.  Current symptoms: feelings of losing control. She denies current suicidal and homicidal ideation. She is walking with her kids every day.  They are financial stressed.  She and  are getting along    The following portions of the patient's history were reviewed and updated as appropriate: allergies, current medications, past family history, past medical history, past social history, past surgical history and problem list.        Assessment:      Anxiety Disorder - improving but doesn't like side effects of Lexapro     Plan:      Medications: Effexor. XR 37.5 mg daily  Exercise

## 2020-05-27 ENCOUNTER — OFFICE VISIT (OUTPATIENT)
Dept: OBSTETRICS AND GYNECOLOGY | Facility: CLINIC | Age: 30
End: 2020-05-27
Payer: COMMERCIAL

## 2020-05-27 DIAGNOSIS — F41.9 ANXIETY: Primary | ICD-10-CM

## 2020-05-27 PROCEDURE — 99213 OFFICE O/P EST LOW 20 MIN: CPT | Mod: 95,,, | Performed by: OBSTETRICS & GYNECOLOGY

## 2020-05-27 PROCEDURE — 99213 PR OFFICE/OUTPT VISIT, EST, LEVL III, 20-29 MIN: ICD-10-PCS | Mod: 95,,, | Performed by: OBSTETRICS & GYNECOLOGY

## 2020-05-27 RX ORDER — VENLAFAXINE HYDROCHLORIDE 37.5 MG/1
37.5 CAPSULE, EXTENDED RELEASE ORAL DAILY
Qty: 30 CAPSULE | Refills: 2 | Status: SHIPPED | OUTPATIENT
Start: 2020-05-27 | End: 2020-07-17

## 2020-07-17 DIAGNOSIS — F41.9 ANXIETY: Primary | ICD-10-CM

## 2020-07-17 RX ORDER — VENLAFAXINE HYDROCHLORIDE 75 MG/1
150 CAPSULE, EXTENDED RELEASE ORAL DAILY
Qty: 30 CAPSULE | Refills: 2 | Status: SHIPPED | OUTPATIENT
Start: 2020-07-17 | End: 2020-09-21 | Stop reason: ALTCHOICE

## 2020-07-30 ENCOUNTER — TELEPHONE (OUTPATIENT)
Dept: OBSTETRICS AND GYNECOLOGY | Facility: CLINIC | Age: 30
End: 2020-07-30

## 2020-07-30 DIAGNOSIS — F41.9 ANXIETY: Primary | ICD-10-CM

## 2020-07-30 RX ORDER — ESCITALOPRAM OXALATE 10 MG/1
10 TABLET ORAL DAILY
Qty: 30 TABLET | Refills: 2 | Status: SHIPPED | OUTPATIENT
Start: 2020-07-30 | End: 2020-12-17

## 2020-07-30 NOTE — TELEPHONE ENCOUNTER
Spoke with pt.  Was better on Lexapro except for sleeep.  Switch back to lexapro 10 mg and titrate melatonin SR 3 mg Q 3 days until sleeping.  F/U in 1 month.  No SI or HI

## 2020-09-01 DIAGNOSIS — E28.2 PCOS (POLYCYSTIC OVARIAN SYNDROME): ICD-10-CM

## 2020-09-02 RX ORDER — SPIRONOLACTONE 25 MG/1
25 TABLET ORAL 2 TIMES DAILY
Qty: 180 TABLET | Refills: 0 | Status: SHIPPED | OUTPATIENT
Start: 2020-09-02 | End: 2020-12-20 | Stop reason: SDUPTHER

## 2020-09-21 ENCOUNTER — OFFICE VISIT (OUTPATIENT)
Dept: OBSTETRICS AND GYNECOLOGY | Facility: CLINIC | Age: 30
End: 2020-09-21
Attending: OBSTETRICS & GYNECOLOGY
Payer: COMMERCIAL

## 2020-09-21 VITALS
HEIGHT: 64 IN | SYSTOLIC BLOOD PRESSURE: 120 MMHG | BODY MASS INDEX: 31.99 KG/M2 | DIASTOLIC BLOOD PRESSURE: 82 MMHG | WEIGHT: 187.38 LBS

## 2020-09-21 DIAGNOSIS — F32.A DEPRESSION, UNSPECIFIED DEPRESSION TYPE: Primary | ICD-10-CM

## 2020-09-21 PROCEDURE — 99213 PR OFFICE/OUTPT VISIT, EST, LEVL III, 20-29 MIN: ICD-10-PCS | Mod: S$GLB,,, | Performed by: OBSTETRICS & GYNECOLOGY

## 2020-09-21 PROCEDURE — 99999 PR PBB SHADOW E&M-EST. PATIENT-LVL III: CPT | Mod: PBBFAC,,, | Performed by: OBSTETRICS & GYNECOLOGY

## 2020-09-21 PROCEDURE — 3008F BODY MASS INDEX DOCD: CPT | Mod: CPTII,S$GLB,, | Performed by: OBSTETRICS & GYNECOLOGY

## 2020-09-21 PROCEDURE — 99213 OFFICE O/P EST LOW 20 MIN: CPT | Mod: S$GLB,,, | Performed by: OBSTETRICS & GYNECOLOGY

## 2020-09-21 PROCEDURE — 99999 PR PBB SHADOW E&M-EST. PATIENT-LVL III: ICD-10-PCS | Mod: PBBFAC,,, | Performed by: OBSTETRICS & GYNECOLOGY

## 2020-09-21 PROCEDURE — 3008F PR BODY MASS INDEX (BMI) DOCUMENTED: ICD-10-PCS | Mod: CPTII,S$GLB,, | Performed by: OBSTETRICS & GYNECOLOGY

## 2020-09-21 RX ORDER — BUPROPION HYDROCHLORIDE 150 MG/1
150 TABLET ORAL EVERY MORNING
Qty: 30 TABLET | Refills: 2 | Status: SHIPPED | OUTPATIENT
Start: 2020-09-21 | End: 2020-12-16

## 2020-09-21 NOTE — PROGRESS NOTES
Subjective:       Dana Sanz is a 30 y.o. female who presents for follow up of depression and anxiety disorder. Effexor made her anxiety worse. So we switched back to Lexapro.  Current symptoms: fatigue, apathy, feeling like she wants to cry.  She feels she has heightened emotions and mood swings. She has gained weight on Lexapro.  She is having hot flashes at night.  She is on Gianvi and doesn't get a period. She denies current suicidal and homicidal ideation    The following portions of the patient's history were reviewed and updated as appropriate: allergies, current medications, past family history, past medical history, past social history, past surgical history and problem list.      Objective:      There were no vitals taken for this visit.   General:  alert, appears stated age and cooperative   Affect/Behavior:  full facial expressions, good grooming, good insight, normal perception, normal reasoning, normal speech pattern and content and normal thought patterns          Assessment:      Anxiety and depression - improving      Plan:      Change to Wellbutrin  mg due to weight gain and inadequate response top Lexapro  F/U 1 month  15 minutes

## 2020-12-17 ENCOUNTER — OFFICE VISIT (OUTPATIENT)
Dept: OBSTETRICS AND GYNECOLOGY | Facility: CLINIC | Age: 30
End: 2020-12-17
Attending: OBSTETRICS & GYNECOLOGY
Payer: COMMERCIAL

## 2020-12-17 VITALS
BODY MASS INDEX: 30.78 KG/M2 | DIASTOLIC BLOOD PRESSURE: 88 MMHG | HEIGHT: 64 IN | WEIGHT: 180.31 LBS | SYSTOLIC BLOOD PRESSURE: 130 MMHG

## 2020-12-17 DIAGNOSIS — Z11.51 SCREENING FOR HUMAN PAPILLOMAVIRUS: ICD-10-CM

## 2020-12-17 DIAGNOSIS — Z12.4 SCREENING FOR MALIGNANT NEOPLASM OF THE CERVIX: Primary | ICD-10-CM

## 2020-12-17 DIAGNOSIS — Z01.419 ENCOUNTER FOR GYNECOLOGICAL EXAMINATION: ICD-10-CM

## 2020-12-17 PROCEDURE — 99395 PREV VISIT EST AGE 18-39: CPT | Mod: S$GLB,,, | Performed by: OBSTETRICS & GYNECOLOGY

## 2020-12-17 PROCEDURE — 87624 HPV HI-RISK TYP POOLED RSLT: CPT

## 2020-12-17 PROCEDURE — 1126F AMNT PAIN NOTED NONE PRSNT: CPT | Mod: S$GLB,,, | Performed by: OBSTETRICS & GYNECOLOGY

## 2020-12-17 PROCEDURE — 3008F PR BODY MASS INDEX (BMI) DOCUMENTED: ICD-10-PCS | Mod: CPTII,S$GLB,, | Performed by: OBSTETRICS & GYNECOLOGY

## 2020-12-17 PROCEDURE — 99395 PR PREVENTIVE VISIT,EST,18-39: ICD-10-PCS | Mod: S$GLB,,, | Performed by: OBSTETRICS & GYNECOLOGY

## 2020-12-17 PROCEDURE — 99999 PR PBB SHADOW E&M-EST. PATIENT-LVL III: CPT | Mod: PBBFAC,,, | Performed by: OBSTETRICS & GYNECOLOGY

## 2020-12-17 PROCEDURE — 1126F PR PAIN SEVERITY QUANTIFIED, NO PAIN PRESENT: ICD-10-PCS | Mod: S$GLB,,, | Performed by: OBSTETRICS & GYNECOLOGY

## 2020-12-17 PROCEDURE — 88175 CYTOPATH C/V AUTO FLUID REDO: CPT

## 2020-12-17 PROCEDURE — 99999 PR PBB SHADOW E&M-EST. PATIENT-LVL III: ICD-10-PCS | Mod: PBBFAC,,, | Performed by: OBSTETRICS & GYNECOLOGY

## 2020-12-17 PROCEDURE — 3008F BODY MASS INDEX DOCD: CPT | Mod: CPTII,S$GLB,, | Performed by: OBSTETRICS & GYNECOLOGY

## 2020-12-17 RX ORDER — LANOLIN ALCOHOL/MO/W.PET/CERES
400 CREAM (GRAM) TOPICAL DAILY
COMMUNITY
End: 2022-02-14

## 2020-12-17 RX ORDER — LIRAGLUTIDE 6 MG/ML
1.2 INJECTION SUBCUTANEOUS DAILY
COMMUNITY
Start: 2020-12-03

## 2020-12-17 RX ORDER — VITAMIN B COMPLEX
1 CAPSULE ORAL DAILY
COMMUNITY

## 2020-12-17 NOTE — PROGRESS NOTES
Subjective:       Patient ID: Dana Sanz is a 30 y.o. female.    Chief Complaint:  No chief complaint on file.      History of Present Illness.  Dana Sanz is a 30 y.o. female.  She has no breast or urinary symptoms.  She has no menorrhagia, oligomenorrhea, bleeding betweeen menses, postcoital bleeding, dysmenorrhea, pelvic pain, dyspareunia, vaginal dryness, vaginal discharge, or sexual complaints.  Doing well on Gianvi.  She has been on Victoza since  for hyperinsulinemia and she lost 7#.    GYN & OB History  No LMP recorded (lmp unknown). (Menstrual status: Birth Control).   Last Pap: No result found  Gardasil:  Yes    OB History    Para Term  AB Living   3 3 3     3   SAB TAB Ectopic Multiple Live Births           3      # Outcome Date GA Lbr Fabien/2nd Weight Sex Delivery Anes PTL Lv   3 Term 16 38w0d  3.657 kg (8 lb 1 oz) M CS-LTranv Spinal N THANG      Complications: PROM (premature rupture of membranes), Previous  section   2 Term 14 37w0d  3.062 kg (6 lb 12 oz) M CS-LTranv Spinal  THANG      Complications: Hypertension affecting pregnancy   1 Term 13 38w0d  3.856 kg (8 lb 8 oz) M CS-LTranv Spinal  THANG      Complications: Failure to Progress in Second Stage, Pre-eclampsia      Obstetric Comments   Age at menarche 12       Past Medical History:   Diagnosis Date    Hyperinsulinemia     Hyperlipidemia     PCOS (polycystic ovarian syndrome)     Relies on partner vasectomy for contraception      Past Surgical History:   Procedure Laterality Date    APPENDECTOMY  2008     SECTION  , , 2016    x 3    TONSILLECTOMY  1992     Family History   Problem Relation Age of Onset    Hypertension Mother     Coronary artery disease Mother     Liver cancer Maternal Grandmother     Lung cancer Maternal Grandfather     Hypertension Brother     No Known Problems Sister     No Known Problems Brother     Ovarian cancer Neg Hx     Colon cancer Neg Hx      Breast cancer Neg Hx     Diabetes Neg Hx     Stroke Neg Hx      Social History     Tobacco Use    Smoking status: Never Smoker    Smokeless tobacco: Never Used   Substance Use Topics    Alcohol use: Yes     Comment: occasional    Drug use: No       Current Outpatient Medications:     b complex vitamins capsule, Take 1 capsule by mouth once daily., Disp: , Rfl:     ergocalciferol, vitamin D2, (VITAMIN D ORAL), Take by mouth., Disp: , Rfl:     GIANVI, 28, 3-0.02 mg per tablet, TAKE 1 TABLET BY MOUTH EVERY DAY, Disp: 84 tablet, Rfl: 0    magnesium oxide (MAG-OX) 400 mg (241.3 mg magnesium) tablet, Take 400 mg by mouth once daily., Disp: , Rfl:     metFORMIN (GLUCOPHAGE-XR) 500 MG XR 24hr tablet, TAKE 2 TABLETS AT BREAKFAST & 2 TABLETS AT DINNER, Disp: 360 tablet, Rfl: 3    spironolactone (ALDACTONE) 50 MG tablet, Take 1 tablet (50 mg total) by mouth once daily., Disp: 30 tablet, Rfl: 11    VICTOZA 2-CHANO 0.6 mg/0.1 mL (18 mg/3 mL) PnIj pen, INJECT 1.2 MG SUBCUTANEOUSLY AS DIRECTED INJECT 1.2 MG SUBCUTANEOUSLY EVERY DAY, Disp: , Rfl:     cyanocobalamin, vitamin B-12, (VITAMIN B-12 ORAL), Take by mouth., Disp: , Rfl:     spironolactone (ALDACTONE) 25 MG tablet, Take 1 tablet (25 mg total) by mouth 2 (two) times daily. (Patient not taking: Reported on 12/17/2020), Disp: 180 tablet, Rfl: 0    Review of patient's allergies indicates:  No Known Allergies    Review of Systems  Review of Systems   Constitutional: Negative for fatigue.   HENT: Negative for trouble swallowing.    Eyes: Negative for visual disturbance.   Respiratory: Negative for cough and shortness of breath.    Cardiovascular: Negative for chest pain.   Gastrointestinal: Negative for abdominal distention, abdominal pain, blood in stool, nausea and vomiting.   Genitourinary: Negative for difficulty urinating, dyspareunia, dysuria, flank pain, frequency, hematuria, pelvic pain, urgency, vaginal bleeding, vaginal discharge and vaginal pain.  "  Musculoskeletal: Negative for arthralgias.   Skin: Negative for rash.   Neurological: Negative for dizziness and headaches.   Psychiatric/Behavioral: Negative for sleep disturbance. The patient is not nervous/anxious.         Objective:     Vitals:    12/17/20 1044   BP: 130/88   Weight: 81.8 kg (180 lb 5.4 oz)   Height: 5' 4" (1.626 m)   PainSc: 0-No pain     Body mass index is 30.95 kg/m².      Physical Exam:   Constitutional: She is oriented to person, place, and time. She appears well-developed and well-nourished.    HENT:   Head: Normocephalic.     Neck: Normal range of motion. No thyromegaly present.     Pulmonary/Chest: Right breast exhibits no mass, no nipple discharge, no skin change, no tenderness and no swelling. Left breast exhibits no mass, no nipple discharge, no skin change, no tenderness and no swelling. Breasts are symmetrical.        Abdominal: Soft. Normal appearance and bowel sounds are normal. She exhibits no distension. There is no abdominal tenderness.     Genitourinary:    Vagina and uterus normal.      Pelvic exam was performed with patient supine.   There is no rash, tenderness, lesion or injury on the right labia. There is no rash, tenderness, lesion or injury on the left labia. Cervix is normal. Right adnexum displays no mass, no tenderness and no fullness. Left adnexum displays no mass, no tenderness and no fullness. No erythema in the vagina. Cervix exhibits no motion tenderness and no discharge. negative for vaginal discharge          Musculoskeletal: Normal range of motion.      Lymphadenopathy:        Right: No supraclavicular adenopathy present.        Left: No supraclavicular adenopathy present.    Neurological: She is alert and oriented to person, place, and time.    Skin: Skin is warm and dry.    Psychiatric: She has a normal mood and affect.        Assessment/ Plan:     Screening for malignant neoplasm of the cervix  -     Liquid-Based Pap Smear, Screening    Screening for " human papillomavirus  -     HPV High Risk Genotypes, PCR    Encounter for gynecological examination      Armolipid Plus discussed  Routine pap smears.  Self breast exam  Diet and exercise discussed.  Contraception reviewed/discussed.  Follow-up with me in 1 year.

## 2020-12-23 LAB
HPV HR 12 DNA SPEC QL NAA+PROBE: NEGATIVE
HPV16 AG SPEC QL: NEGATIVE
HPV18 DNA SPEC QL NAA+PROBE: NEGATIVE

## 2020-12-30 ENCOUNTER — HOSPITAL ENCOUNTER (EMERGENCY)
Facility: HOSPITAL | Age: 30
Discharge: HOME | End: 2020-12-30
Attending: EMERGENCY MEDICINE
Payer: COMMERCIAL

## 2020-12-30 VITALS
HEIGHT: 64 IN | OXYGEN SATURATION: 98 % | RESPIRATION RATE: 18 BRPM | SYSTOLIC BLOOD PRESSURE: 136 MMHG | BODY MASS INDEX: 29.71 KG/M2 | HEART RATE: 90 BPM | DIASTOLIC BLOOD PRESSURE: 86 MMHG | WEIGHT: 174 LBS | TEMPERATURE: 99 F

## 2020-12-30 DIAGNOSIS — R10.9 ABDOMINAL PAIN, UNSPECIFIED ABDOMINAL LOCATION: Primary | ICD-10-CM

## 2020-12-30 DIAGNOSIS — R10.9 ABDOMINAL PAIN: ICD-10-CM

## 2020-12-30 LAB
ALBUMIN SERPL BCP-MCNC: 4.3 G/DL (ref 3.5–5.2)
ALP SERPL-CCNC: 53 U/L (ref 55–135)
ALT SERPL W/O P-5'-P-CCNC: 50 U/L (ref 10–44)
ANION GAP SERPL CALC-SCNC: 10 MMOL/L (ref 8–16)
AST SERPL-CCNC: 26 U/L (ref 10–40)
B-HCG UR QL: NEGATIVE
BASOPHILS # BLD AUTO: 0.03 K/UL (ref 0–0.2)
BASOPHILS NFR BLD: 0.4 % (ref 0–1.9)
BILIRUB SERPL-MCNC: 0.7 MG/DL (ref 0.1–1)
BILIRUB UR QL STRIP: NEGATIVE
BUN SERPL-MCNC: 9 MG/DL (ref 6–20)
CALCIUM SERPL-MCNC: 9.7 MG/DL (ref 8.7–10.5)
CHLORIDE SERPL-SCNC: 105 MMOL/L (ref 95–110)
CLARITY UR: CLEAR
CO2 SERPL-SCNC: 24 MMOL/L (ref 23–29)
COLOR UR: COLORLESS
CREAT SERPL-MCNC: 0.8 MG/DL (ref 0.5–1.4)
CTP QC/QA: YES
DIFFERENTIAL METHOD: NORMAL
EOSINOPHIL # BLD AUTO: 0.1 K/UL (ref 0–0.5)
EOSINOPHIL NFR BLD: 0.9 % (ref 0–8)
ERYTHROCYTE [DISTWIDTH] IN BLOOD BY AUTOMATED COUNT: 12.6 % (ref 11.5–14.5)
EST. GFR  (AFRICAN AMERICAN): >60 ML/MIN/1.73 M^2
EST. GFR  (NON AFRICAN AMERICAN): >60 ML/MIN/1.73 M^2
GLUCOSE SERPL-MCNC: 93 MG/DL (ref 70–110)
GLUCOSE UR QL STRIP: NEGATIVE
HCT VFR BLD AUTO: 41.1 % (ref 37–48.5)
HGB BLD-MCNC: 13.6 G/DL (ref 12–16)
HGB UR QL STRIP: NEGATIVE
IMM GRANULOCYTES # BLD AUTO: 0.03 K/UL (ref 0–0.04)
IMM GRANULOCYTES NFR BLD AUTO: 0.4 % (ref 0–0.5)
KETONES UR QL STRIP: NEGATIVE
LEUKOCYTE ESTERASE UR QL STRIP: NEGATIVE
LYMPHOCYTES # BLD AUTO: 2.2 K/UL (ref 1–4.8)
LYMPHOCYTES NFR BLD: 32.1 % (ref 18–48)
MCH RBC QN AUTO: 28.8 PG (ref 27–31)
MCHC RBC AUTO-ENTMCNC: 33.1 G/DL (ref 32–36)
MCV RBC AUTO: 87 FL (ref 82–98)
MONOCYTES # BLD AUTO: 0.5 K/UL (ref 0.3–1)
MONOCYTES NFR BLD: 7.3 % (ref 4–15)
NEUTROPHILS # BLD AUTO: 4 K/UL (ref 1.8–7.7)
NEUTROPHILS NFR BLD: 58.9 % (ref 38–73)
NITRITE UR QL STRIP: NEGATIVE
NRBC BLD-RTO: 0 /100 WBC
PH UR STRIP: 7 [PH] (ref 5–8)
PLATELET # BLD AUTO: 251 K/UL (ref 150–350)
PMV BLD AUTO: 10.5 FL (ref 9.2–12.9)
POTASSIUM SERPL-SCNC: 4.1 MMOL/L (ref 3.5–5.1)
PROT SERPL-MCNC: 8.1 G/DL (ref 6–8.4)
PROT UR QL STRIP: NEGATIVE
RBC # BLD AUTO: 4.72 M/UL (ref 4–5.4)
SARS-COV-2 RDRP RESP QL NAA+PROBE: NEGATIVE
SODIUM SERPL-SCNC: 139 MMOL/L (ref 136–145)
SP GR UR STRIP: 1 (ref 1–1.03)
URN SPEC COLLECT METH UR: ABNORMAL
UROBILINOGEN UR STRIP-ACNC: NEGATIVE EU/DL
WBC # BLD AUTO: 6.83 K/UL (ref 3.9–12.7)

## 2020-12-30 PROCEDURE — 85025 COMPLETE CBC W/AUTO DIFF WBC: CPT

## 2020-12-30 PROCEDURE — 80053 COMPREHEN METABOLIC PANEL: CPT

## 2020-12-30 PROCEDURE — 36415 COLL VENOUS BLD VENIPUNCTURE: CPT

## 2020-12-30 PROCEDURE — U0002 COVID-19 LAB TEST NON-CDC: HCPCS

## 2020-12-30 PROCEDURE — 25500020 PHARM REV CODE 255: Performed by: EMERGENCY MEDICINE

## 2020-12-30 PROCEDURE — 96375 TX/PRO/DX INJ NEW DRUG ADDON: CPT

## 2020-12-30 PROCEDURE — 63600175 PHARM REV CODE 636 W HCPCS: Performed by: EMERGENCY MEDICINE

## 2020-12-30 PROCEDURE — 81025 URINE PREGNANCY TEST: CPT | Performed by: NURSE PRACTITIONER

## 2020-12-30 PROCEDURE — 96374 THER/PROPH/DIAG INJ IV PUSH: CPT

## 2020-12-30 PROCEDURE — 99285 EMERGENCY DEPT VISIT HI MDM: CPT | Mod: 25

## 2020-12-30 PROCEDURE — 81003 URINALYSIS AUTO W/O SCOPE: CPT

## 2020-12-30 RX ORDER — ONDANSETRON 2 MG/ML
4 INJECTION INTRAMUSCULAR; INTRAVENOUS
Status: COMPLETED | OUTPATIENT
Start: 2020-12-30 | End: 2020-12-30

## 2020-12-30 RX ORDER — DICYCLOMINE HYDROCHLORIDE 20 MG/1
20 TABLET ORAL EVERY 8 HOURS PRN
Qty: 20 TABLET | Refills: 0 | Status: SHIPPED | OUTPATIENT
Start: 2020-12-30 | End: 2021-01-08

## 2020-12-30 RX ORDER — KETOROLAC TROMETHAMINE 30 MG/ML
15 INJECTION, SOLUTION INTRAMUSCULAR; INTRAVENOUS
Status: COMPLETED | OUTPATIENT
Start: 2020-12-30 | End: 2020-12-30

## 2020-12-30 RX ADMIN — KETOROLAC TROMETHAMINE 15 MG: 30 INJECTION, SOLUTION INTRAMUSCULAR; INTRAVENOUS at 07:12

## 2020-12-30 RX ADMIN — ONDANSETRON 4 MG: 2 INJECTION INTRAMUSCULAR; INTRAVENOUS at 07:12

## 2020-12-30 RX ADMIN — IOHEXOL 100 ML: 350 INJECTION, SOLUTION INTRAVENOUS at 07:12

## 2020-12-30 NOTE — FIRST PROVIDER EVALUATION
"Medical screening exam completed.  I have conducted a focused provider triage encounter, findings are as follows:    Brief history of present illness:  Left lower quadrant pain  Pt has history of PCOS and ovarian cyst  Onset 2 days ago pt reports N/D without emesis     Vitals:    12/30/20 1650   BP: (!) 140/95   BP Location: Left arm   Patient Position: Sitting   Pulse: 89   Resp: 18   Temp: 99 °F (37.2 °C)   TempSrc: Oral   SpO2: 100%   Weight: 78.9 kg (174 lb)   Height: 5' 4" (1.626 m)       Pertinent physical exam:  pain left lower quadrant pain with palpation    Brief workup plan:  abdomen work up with US    Preliminary workup initiated; this workup will be continued and followed by the physician or advanced practice provider that is assigned to the patient when roomed.  "

## 2020-12-31 NOTE — ED PROVIDER NOTES
Encounter Date: 2020       History     Chief Complaint   Patient presents with    Abdominal Pain     LLQ abd pain x 4 days, worsening symptoms     This is a 30-year-old female who presents complaining of left lower quadrant abdominal pain.  Symptoms have been present over the past 4 days and have worsened recently.  She does report having a history of chronic abdominal pain.  She states that symptoms are worse than previous episodes.  She states she has had chronic symptoms now for several years and has had previous ovarian cyst.  She denies fever or chills.  She denies vomiting but has had nausea.  The pain is not worse with movement but is worse with palpation.  She denies any associated dysuria frequency urgency or vaginal discharge.  She is sexually active with 1 partner only and denies any history of STDs.  Symptoms have been moderate intensity with no exacerbating or alleviating factors.  She denies any other problems or complaints.        Review of patient's allergies indicates:  No Known Allergies  Past Medical History:   Diagnosis Date    Hyperinsulinemia     Hyperlipidemia     PCOS (polycystic ovarian syndrome)     Relies on partner vasectomy for contraception      Past Surgical History:   Procedure Laterality Date    APPENDECTOMY  2008     SECTION  , , 2016    x 3    TONSILLECTOMY  1992     Family History   Problem Relation Age of Onset    Hypertension Mother     Coronary artery disease Mother     Liver cancer Maternal Grandmother     Lung cancer Maternal Grandfather     Hypertension Brother     No Known Problems Sister     No Known Problems Brother     Ovarian cancer Neg Hx     Colon cancer Neg Hx     Breast cancer Neg Hx     Diabetes Neg Hx     Stroke Neg Hx      Social History     Tobacco Use    Smoking status: Never Smoker    Smokeless tobacco: Never Used   Substance Use Topics    Alcohol use: Yes     Comment: occasional    Drug use: No     Review of  Systems   Constitutional: Negative.  Negative for activity change, appetite change, chills, fatigue and fever.   HENT: Negative.  Negative for congestion, dental problem, ear pain, rhinorrhea, sinus pressure, sinus pain, sore throat and trouble swallowing.    Eyes: Negative.  Negative for photophobia, pain, redness and visual disturbance.   Respiratory: Negative.  Negative for cough, chest tightness, shortness of breath and wheezing.    Cardiovascular: Negative.  Negative for chest pain, palpitations and leg swelling.   Gastrointestinal: Positive for abdominal pain and nausea. Negative for abdominal distention, anal bleeding, blood in stool, constipation, diarrhea and vomiting.   Endocrine: Negative.    Genitourinary: Negative.  Negative for decreased urine volume, difficulty urinating, dysuria, flank pain, frequency, hematuria, pelvic pain, urgency, vaginal bleeding, vaginal discharge and vaginal pain.   Musculoskeletal: Negative.  Negative for arthralgias, back pain, gait problem, joint swelling, myalgias, neck pain and neck stiffness.   Skin: Negative.  Negative for color change, pallor and rash.   Neurological: Negative.  Negative for dizziness, tremors, seizures, syncope, facial asymmetry, speech difficulty, weakness, light-headedness, numbness and headaches.   Hematological: Negative.  Does not bruise/bleed easily.   Psychiatric/Behavioral: Negative.  Negative for confusion.   All other systems reviewed and are negative.      Physical Exam     Initial Vitals [12/30/20 1650]   BP Pulse Resp Temp SpO2   (!) 140/95 89 18 99 °F (37.2 °C) 100 %      MAP       --         Physical Exam    Nursing note and vitals reviewed.  Constitutional: She is active and cooperative.  Non-toxic appearance. She does not have a sickly appearance. She does not appear ill. No distress.   HENT:   Head: Normocephalic and atraumatic.   Right Ear: Tympanic membrane normal.   Left Ear: Tympanic membrane normal.   Nose: Nose normal.    Mouth/Throat: Uvula is midline, oropharynx is clear and moist and mucous membranes are normal. No oral lesions. No uvula swelling. No oropharyngeal exudate, posterior oropharyngeal edema or posterior oropharyngeal erythema.   Eyes: Conjunctivae, EOM and lids are normal. Pupils are equal, round, and reactive to light. No scleral icterus.   Neck: Trachea normal, normal range of motion, full passive range of motion without pain and phonation normal. Neck supple. No thyroid mass present. No stridor present. No spinous process tenderness and no muscular tenderness present. No edema, no erythema and normal range of motion present. No neck rigidity. No JVD present.   Cardiovascular: Normal rate, regular rhythm, normal heart sounds, intact distal pulses and normal pulses. Exam reveals no gallop and no friction rub.    No murmur heard.  Pulmonary/Chest: Effort normal and breath sounds normal. No accessory muscle usage. No tachypnea. No respiratory distress. She has no wheezes. She has no rhonchi. She has no rales.   Abdominal: Soft. Normal appearance and bowel sounds are normal. She exhibits no distension, no pulsatile midline mass and no mass. There is no hepatosplenomegaly. There is abdominal tenderness in the left lower quadrant. There is no rigidity, no rebound, no guarding, no CVA tenderness, no tenderness at McBurney's point and negative Davis's sign.   Musculoskeletal: Normal range of motion. No tenderness or edema.      Comments: Pulses are 2+ throughout, cap refill is less than 2 sec throughout, extremities are nontender throughout with full range of motion. There is no spinal tenderness to palpation.   Neurological: She is alert and oriented to person, place, and time. She has normal strength. She displays normal reflexes. No cranial nerve deficit or sensory deficit.   No focal deficits.   Skin: Skin is warm, dry and intact. Capillary refill takes less than 2 seconds. No ecchymosis, no petechiae and no rash  noted. No erythema. No pallor.   Psychiatric: She has a normal mood and affect. Her speech is normal and behavior is normal. Judgment and thought content normal. Cognition and memory are normal.         ED Course   Procedures  Labs Reviewed   COMPREHENSIVE METABOLIC PANEL - Abnormal; Notable for the following components:       Result Value    Alkaline Phosphatase 53 (*)     ALT 50 (*)     All other components within normal limits   URINALYSIS, REFLEX TO URINE CULTURE - Abnormal; Notable for the following components:    Color, UA Colorless (*)     All other components within normal limits    Narrative:     In and Out Cath as needed it patient unable to void  Specimen Source->Urine   CBC W/ AUTO DIFFERENTIAL   SARS-COV-2 RNA AMPLIFICATION, QUAL   POCT URINE PREGNANCY          Imaging Results          CT Abdomen Pelvis With Contrast (Final result)  Result time 12/30/20 19:15:00    Final result by Gregorio Elias MD (12/30/20 19:15:00)                 Narrative:    EXAM DESCRIPTION: CT ABDOMEN PELVIS WITH CONTRAST 12/30/2020 7:15 PM CST    CLINICAL HISTORY: 30 years Female, Abdominal infection suspected; ;    COMPARISON:  None.    Technical factors: This exam was performed according to our departmental dose-optimization program, which includes automated exposure control, adjustment of the mA and/or kV according to patient size and/or use of iterative reconstruction technique.    Images were obtained after the administration of 100 mL of Omnipaque 350 intravenous contrast.    FINDINGS:  Limited evaluation of the lower chest reveals clear lung bases.    Liver, spleen, pancreas, gallbladder, and both adrenal glands appear normal.    Subcentimeter low-density lesion is noted about the interpolar region of the right kidney, too small to accurately characterize. Both kidneys otherwise enhance symmetrically. No hydronephrosis or hydroureter. Urinary bladder is well distended and shows no suspicious finding.    Uterus and both  ovaries show no suspicious abnormality either.    Small and large bowel are normal in caliber. No evidence of bowel obstruction. Appendix is not visualized.    Vascular structures opacify with contrast normally. No suspicious lymphadenopathy or drainable fluid collections are appreciated.    Bone windows show no destructive osseous lesions.    IMPRESSION:  No acute abnormality within the abdomen or pelvis.    Electronically signed by:  Gregorio Elias MD  12/30/2020 8:29 PM CST Workstation: [id number]                             US Pelvis Complete Non OB (Final result)  Result time 12/30/20 18:35:57   Procedure changed from US Transvaginal Non OB     Final result by Kasi Roe MD (12/30/20 18:35:57)                 Narrative:    HISTORY: Acute left-sided pelvic pain, negative urine pregnancy test.    FINDINGS: Transabdominal pelvic ultrasound was performed, with  comparison to multiple prior exams. The uterus is anteverted,  measuring 9 cm in length, with normal echotexture. The echogenic  endometrial complex is normal measuring 2 mm maximum thickness. There  is no endometrial mass or fluid collection demonstrated.    Both ovaries are normal in size and echotexture, and have normal color  and spectral Doppler vascular flow. The right ovary measures 3.6 x 2.7  x 1.8 cm, with the left ovary measuring 2.7 x 2.4 x 1.5 cm.    There are no adnexal masses or pelvic free fluid demonstrated. The  urinary bladder is normal.    IMPRESSION: Negative pelvic ultrasound.    Electronically Signed by Kasi CORDOVA on 12/30/2020 6:55 PM                               Medical Decision Making:   Clinical Tests:   Lab Tests: Reviewed  Radiological Study: Reviewed  Medical Tests: Reviewed  ED Management:  Patient opted to not have a pelvic exam while in the emergency room.  She has been hemodynamically stable.  CT scan is negative for evidence of acute intra-abdominal abnormality.  Pelvic ultrasound does not demonstrate evidence of  current ovarian cyst or cyst rupture.  Have explained the importance of close outpatient evaluation with her physician.  Have also arranged an appointment with [facility].  Return precautions have been discussed in detail and need for close follow-up has been discussed.                             Clinical Impression:       ICD-10-CM ICD-9-CM   1. Abdominal pain, unspecified abdominal location  R10.9 789.00   2. Abdominal pain  R10.9 789.00                          ED Disposition Condition    Discharge Stable        ED Prescriptions     Medication Sig Dispense Start Date End Date Auth. Provider    dicyclomine (BENTYL) 20 mg tablet Take 1 tablet (20 mg total) by mouth every 8 (eight) hours as needed (abdominal cramping). 20 tablet 12/30/2020 1/8/2021 Temitope Jacob MD        Follow-up Information     Follow up With Specialties Details Why Contact Info    [facility]  Schedule an appointment as soon as possible for a visit in 1 day  [address]  [place] [address]  [phone]                                       Temitope Jacob MD  12/31/20 7060

## 2020-12-31 NOTE — ED NOTES
Pt states pain feels worse after US, ECP aware. Safety precautions in place. Call light within reach. Will continue to provide care accordingly. .

## 2020-12-31 NOTE — DISCHARGE INSTRUCTIONS
Please read and follow discharge instructions and return precautions.  Important to see your primary care physician or [facility] primary care in the next 1-4 days.  Tylenol or ibuprofen over-the-counter as directed if needed for pain.  Bentyl as directed if needed for abdominal cramping.  Keep well hydrated--drink 8 glasses of water daily.  Follow a bland diet.  Attempt use of a heating pad as well.  Return immediately if you develop new or worsening symptoms or if you have any new problems or concerns.

## 2020-12-31 NOTE — ED NOTES
Pt updated on POC. Pt denies further needs at this time. Safety precautions in place. Call light within reach. Will continue to provide care accordingly.

## 2021-01-26 LAB
FINAL PATHOLOGIC DIAGNOSIS: NORMAL
Lab: NORMAL

## 2021-11-16 ENCOUNTER — TELEPHONE (OUTPATIENT)
Dept: SURGERY | Facility: CLINIC | Age: 31
End: 2021-11-16
Payer: COMMERCIAL

## 2021-11-17 ENCOUNTER — OFFICE VISIT (OUTPATIENT)
Dept: SURGERY | Facility: CLINIC | Age: 31
End: 2021-11-17
Payer: COMMERCIAL

## 2021-11-17 VITALS — SYSTOLIC BLOOD PRESSURE: 133 MMHG | HEART RATE: 71 BPM | DIASTOLIC BLOOD PRESSURE: 93 MMHG | TEMPERATURE: 98 F

## 2021-11-17 DIAGNOSIS — K80.20 SYMPTOMATIC CHOLELITHIASIS: Primary | ICD-10-CM

## 2021-11-17 DIAGNOSIS — Z01.818 PRE-OP TESTING: ICD-10-CM

## 2021-11-17 PROCEDURE — 1159F MED LIST DOCD IN RCRD: CPT | Mod: CPTII,S$GLB,, | Performed by: SURGERY

## 2021-11-17 PROCEDURE — 99203 PR OFFICE/OUTPT VISIT, NEW, LEVL III, 30-44 MIN: ICD-10-PCS | Mod: S$GLB,,, | Performed by: SURGERY

## 2021-11-17 PROCEDURE — 99203 OFFICE O/P NEW LOW 30 MIN: CPT | Mod: S$GLB,,, | Performed by: SURGERY

## 2021-11-17 PROCEDURE — 3075F SYST BP GE 130 - 139MM HG: CPT | Mod: CPTII,S$GLB,, | Performed by: SURGERY

## 2021-11-17 PROCEDURE — 3080F PR MOST RECENT DIASTOLIC BLOOD PRESSURE >= 90 MM HG: ICD-10-PCS | Mod: CPTII,S$GLB,, | Performed by: SURGERY

## 2021-11-17 PROCEDURE — 3080F DIAST BP >= 90 MM HG: CPT | Mod: CPTII,S$GLB,, | Performed by: SURGERY

## 2021-11-17 PROCEDURE — 3075F PR MOST RECENT SYSTOLIC BLOOD PRESS GE 130-139MM HG: ICD-10-PCS | Mod: CPTII,S$GLB,, | Performed by: SURGERY

## 2021-11-17 PROCEDURE — 1159F PR MEDICATION LIST DOCUMENTED IN MEDICAL RECORD: ICD-10-PCS | Mod: CPTII,S$GLB,, | Performed by: SURGERY

## 2021-11-17 RX ORDER — PEN NEEDLE, DIABETIC 32 GX 1/6"
NEEDLE, DISPOSABLE MISCELLANEOUS
COMMUNITY
Start: 2021-11-13

## 2021-11-17 RX ORDER — DICYCLOMINE HYDROCHLORIDE 10 MG/1
10 CAPSULE ORAL
COMMUNITY
Start: 2021-10-22

## 2021-11-17 RX ORDER — FLUTICASONE PROPIONATE 50 MCG
1 SPRAY, SUSPENSION (ML) NASAL DAILY
COMMUNITY
Start: 2021-09-15

## 2021-11-18 ENCOUNTER — HOSPITAL ENCOUNTER (OUTPATIENT)
Dept: PREADMISSION TESTING | Facility: HOSPITAL | Age: 31
Discharge: HOME | End: 2021-11-18
Attending: SURGERY
Payer: COMMERCIAL

## 2021-11-18 VITALS — WEIGHT: 142 LBS | BODY MASS INDEX: 24.24 KG/M2 | HEIGHT: 64 IN

## 2021-11-18 DIAGNOSIS — Z01.818 PREOP TESTING: Primary | ICD-10-CM

## 2021-11-18 LAB
ANION GAP SERPL CALC-SCNC: 11 MMOL/L (ref 8–16)
BASOPHILS # BLD AUTO: 0.02 K/UL (ref 0–0.2)
BASOPHILS NFR BLD: 0.4 % (ref 0–1.9)
BUN SERPL-MCNC: 7 MG/DL (ref 6–20)
CALCIUM SERPL-MCNC: 9.7 MG/DL (ref 8.7–10.5)
CHLORIDE SERPL-SCNC: 103 MMOL/L (ref 95–110)
CO2 SERPL-SCNC: 23 MMOL/L (ref 23–29)
CREAT SERPL-MCNC: 0.9 MG/DL (ref 0.5–1.4)
DIFFERENTIAL METHOD: NORMAL
EOSINOPHIL # BLD AUTO: 0.1 K/UL (ref 0–0.5)
EOSINOPHIL NFR BLD: 1.9 % (ref 0–8)
ERYTHROCYTE [DISTWIDTH] IN BLOOD BY AUTOMATED COUNT: 12.5 % (ref 11.5–14.5)
EST. GFR  (AFRICAN AMERICAN): >60 ML/MIN/1.73 M^2
EST. GFR  (NON AFRICAN AMERICAN): >60 ML/MIN/1.73 M^2
GLUCOSE SERPL-MCNC: 96 MG/DL (ref 70–110)
HCT VFR BLD AUTO: 39.5 % (ref 37–48.5)
HGB BLD-MCNC: 13 G/DL (ref 12–16)
IMM GRANULOCYTES # BLD AUTO: 0.02 K/UL (ref 0–0.04)
IMM GRANULOCYTES NFR BLD AUTO: 0.4 % (ref 0–0.5)
LYMPHOCYTES # BLD AUTO: 2.1 K/UL (ref 1–4.8)
LYMPHOCYTES NFR BLD: 39.2 % (ref 18–48)
MCH RBC QN AUTO: 28.6 PG (ref 27–31)
MCHC RBC AUTO-ENTMCNC: 32.9 G/DL (ref 32–36)
MCV RBC AUTO: 87 FL (ref 82–98)
MONOCYTES # BLD AUTO: 0.4 K/UL (ref 0.3–1)
MONOCYTES NFR BLD: 7.6 % (ref 4–15)
NEUTROPHILS # BLD AUTO: 2.7 K/UL (ref 1.8–7.7)
NEUTROPHILS NFR BLD: 50.5 % (ref 38–73)
NRBC BLD-RTO: 0 /100 WBC
PLATELET # BLD AUTO: 257 K/UL (ref 150–450)
PMV BLD AUTO: 10.6 FL (ref 9.2–12.9)
POTASSIUM SERPL-SCNC: 4.2 MMOL/L (ref 3.5–5.1)
RBC # BLD AUTO: 4.55 M/UL (ref 4–5.4)
SODIUM SERPL-SCNC: 137 MMOL/L (ref 136–145)
WBC # BLD AUTO: 5.26 K/UL (ref 3.9–12.7)

## 2021-11-18 PROCEDURE — 85025 COMPLETE CBC W/AUTO DIFF WBC: CPT | Performed by: ANESTHESIOLOGY

## 2021-11-18 PROCEDURE — 80048 BASIC METABOLIC PNL TOTAL CA: CPT | Performed by: ANESTHESIOLOGY

## 2021-11-18 PROCEDURE — 99900104 DSU ONLY-NO CHARGE-EA ADD'L HR (STAT)

## 2021-11-18 PROCEDURE — 36415 COLL VENOUS BLD VENIPUNCTURE: CPT | Performed by: ANESTHESIOLOGY

## 2021-11-18 PROCEDURE — 99900103 DSU ONLY-NO CHARGE-INITIAL HR (STAT)

## 2021-11-20 ENCOUNTER — LAB VISIT (OUTPATIENT)
Dept: PRIMARY CARE CLINIC | Facility: CLINIC | Age: 31
End: 2021-11-20
Payer: COMMERCIAL

## 2021-11-20 DIAGNOSIS — Z01.818 PRE-OP TESTING: ICD-10-CM

## 2021-11-20 PROCEDURE — U0005 INFEC AGEN DETEC AMPLI PROBE: HCPCS | Performed by: SURGERY

## 2021-11-20 PROCEDURE — U0003 INFECTIOUS AGENT DETECTION BY NUCLEIC ACID (DNA OR RNA); SEVERE ACUTE RESPIRATORY SYNDROME CORONAVIRUS 2 (SARS-COV-2) (CORONAVIRUS DISEASE [COVID-19]), AMPLIFIED PROBE TECHNIQUE, MAKING USE OF HIGH THROUGHPUT TECHNOLOGIES AS DESCRIBED BY CMS-2020-01-R: HCPCS | Performed by: SURGERY

## 2021-11-21 LAB
SARS-COV-2 RNA RESP QL NAA+PROBE: NOT DETECTED
SARS-COV-2- CYCLE NUMBER: NORMAL

## 2021-11-22 ENCOUNTER — ANESTHESIA EVENT (OUTPATIENT)
Dept: SURGERY | Facility: HOSPITAL | Age: 31
End: 2021-11-22
Payer: COMMERCIAL

## 2021-11-23 ENCOUNTER — HOSPITAL ENCOUNTER (OUTPATIENT)
Facility: HOSPITAL | Age: 31
Discharge: HOME | End: 2021-11-23
Attending: SURGERY | Admitting: SURGERY
Payer: COMMERCIAL

## 2021-11-23 ENCOUNTER — ANESTHESIA (OUTPATIENT)
Dept: SURGERY | Facility: HOSPITAL | Age: 31
End: 2021-11-23
Payer: COMMERCIAL

## 2021-11-23 DIAGNOSIS — K80.20 SYMPTOMATIC CHOLELITHIASIS: Primary | ICD-10-CM

## 2021-11-23 LAB
B-HCG UR QL: NEGATIVE
CTP QC/QA: YES

## 2021-11-23 PROCEDURE — 36000708 HC OR TIME LEV III 1ST 15 MIN: Performed by: SURGERY

## 2021-11-23 PROCEDURE — 36000709 HC OR TIME LEV III EA ADD 15 MIN: Performed by: SURGERY

## 2021-11-23 PROCEDURE — 37000009 HC ANESTHESIA EA ADD 15 MINS: Performed by: SURGERY

## 2021-11-23 PROCEDURE — 25000003 PHARM REV CODE 250: Performed by: NURSE ANESTHETIST, CERTIFIED REGISTERED

## 2021-11-23 PROCEDURE — 88304 PR  SURG PATH,LEVEL III: ICD-10-PCS | Mod: 26,,, | Performed by: STUDENT IN AN ORGANIZED HEALTH CARE EDUCATION/TRAINING PROGRAM

## 2021-11-23 PROCEDURE — 99900103 DSU ONLY-NO CHARGE-INITIAL HR (STAT): Performed by: SURGERY

## 2021-11-23 PROCEDURE — 63600175 PHARM REV CODE 636 W HCPCS: Performed by: ANESTHESIOLOGY

## 2021-11-23 PROCEDURE — D9220A PRA ANESTHESIA: Mod: CRNA,,, | Performed by: NURSE ANESTHETIST, CERTIFIED REGISTERED

## 2021-11-23 PROCEDURE — 71000016 HC POSTOP RECOV ADDL HR: Performed by: SURGERY

## 2021-11-23 PROCEDURE — 27201423 OPTIME MED/SURG SUP & DEVICES STERILE SUPPLY: Performed by: SURGERY

## 2021-11-23 PROCEDURE — 71000033 HC RECOVERY, INTIAL HOUR: Performed by: SURGERY

## 2021-11-23 PROCEDURE — 47562 LAPAROSCOPIC CHOLECYSTECTOMY: CPT | Mod: ,,, | Performed by: SURGERY

## 2021-11-23 PROCEDURE — D9220A PRA ANESTHESIA: ICD-10-PCS | Mod: ANES,,, | Performed by: ANESTHESIOLOGY

## 2021-11-23 PROCEDURE — 36000710: Performed by: SURGERY

## 2021-11-23 PROCEDURE — 71000015 HC POSTOP RECOV 1ST HR: Performed by: SURGERY

## 2021-11-23 PROCEDURE — 88304 TISSUE EXAM BY PATHOLOGIST: CPT | Performed by: STUDENT IN AN ORGANIZED HEALTH CARE EDUCATION/TRAINING PROGRAM

## 2021-11-23 PROCEDURE — 25000003 PHARM REV CODE 250: Performed by: SURGERY

## 2021-11-23 PROCEDURE — D9220A PRA ANESTHESIA: ICD-10-PCS | Mod: CRNA,,, | Performed by: NURSE ANESTHETIST, CERTIFIED REGISTERED

## 2021-11-23 PROCEDURE — 99900104 DSU ONLY-NO CHARGE-EA ADD'L HR (STAT): Performed by: SURGERY

## 2021-11-23 PROCEDURE — 37000008 HC ANESTHESIA 1ST 15 MINUTES: Performed by: SURGERY

## 2021-11-23 PROCEDURE — 71000039 HC RECOVERY, EACH ADD'L HOUR: Performed by: SURGERY

## 2021-11-23 PROCEDURE — 63600175 PHARM REV CODE 636 W HCPCS: Performed by: NURSE ANESTHETIST, CERTIFIED REGISTERED

## 2021-11-23 PROCEDURE — 81025 URINE PREGNANCY TEST: CPT | Performed by: ANESTHESIOLOGY

## 2021-11-23 PROCEDURE — 36000711: Performed by: SURGERY

## 2021-11-23 PROCEDURE — D9220A PRA ANESTHESIA: Mod: ANES,,, | Performed by: ANESTHESIOLOGY

## 2021-11-23 PROCEDURE — 47562 PR LAP,CHOLECYSTECTOMY: ICD-10-PCS | Mod: ,,, | Performed by: SURGERY

## 2021-11-23 PROCEDURE — 63600175 PHARM REV CODE 636 W HCPCS: Performed by: SURGERY

## 2021-11-23 PROCEDURE — 88304 TISSUE EXAM BY PATHOLOGIST: CPT | Mod: 26,,, | Performed by: STUDENT IN AN ORGANIZED HEALTH CARE EDUCATION/TRAINING PROGRAM

## 2021-11-23 PROCEDURE — 25000003 PHARM REV CODE 250: Performed by: ANESTHESIOLOGY

## 2021-11-23 RX ORDER — METOCLOPRAMIDE HYDROCHLORIDE 5 MG/ML
10 INJECTION INTRAMUSCULAR; INTRAVENOUS EVERY 10 MIN PRN
Status: COMPLETED | OUTPATIENT
Start: 2021-11-23 | End: 2021-11-23

## 2021-11-23 RX ORDER — MIDAZOLAM HYDROCHLORIDE 1 MG/ML
INJECTION INTRAMUSCULAR; INTRAVENOUS
Status: DISCONTINUED | OUTPATIENT
Start: 2021-11-23 | End: 2021-11-23

## 2021-11-23 RX ORDER — FENTANYL CITRATE 50 UG/ML
25 INJECTION, SOLUTION INTRAMUSCULAR; INTRAVENOUS EVERY 5 MIN PRN
Status: COMPLETED | OUTPATIENT
Start: 2021-11-23 | End: 2021-11-23

## 2021-11-23 RX ORDER — ACETAMINOPHEN 10 MG/ML
INJECTION, SOLUTION INTRAVENOUS
Status: DISCONTINUED | OUTPATIENT
Start: 2021-11-23 | End: 2021-11-23

## 2021-11-23 RX ORDER — LIDOCAINE HCL/PF 100 MG/5ML
SYRINGE (ML) INTRAVENOUS
Status: DISCONTINUED | OUTPATIENT
Start: 2021-11-23 | End: 2021-11-23

## 2021-11-23 RX ORDER — ONDANSETRON 2 MG/ML
4 INJECTION INTRAMUSCULAR; INTRAVENOUS ONCE
Status: COMPLETED | OUTPATIENT
Start: 2021-11-23 | End: 2021-11-23

## 2021-11-23 RX ORDER — ACETAMINOPHEN 500 MG
1000 TABLET ORAL EVERY 8 HOURS
Refills: 0 | COMMUNITY
Start: 2021-11-23 | End: 2021-11-26

## 2021-11-23 RX ORDER — SODIUM CHLORIDE, SODIUM LACTATE, POTASSIUM CHLORIDE, CALCIUM CHLORIDE 600; 310; 30; 20 MG/100ML; MG/100ML; MG/100ML; MG/100ML
INJECTION, SOLUTION INTRAVENOUS CONTINUOUS
Status: DISCONTINUED | OUTPATIENT
Start: 2021-11-23 | End: 2021-11-23 | Stop reason: HOSPADM

## 2021-11-23 RX ORDER — KETOROLAC TROMETHAMINE 30 MG/ML
INJECTION, SOLUTION INTRAMUSCULAR; INTRAVENOUS
Status: DISCONTINUED | OUTPATIENT
Start: 2021-11-23 | End: 2021-11-23

## 2021-11-23 RX ORDER — CEFOXITIN SODIUM 2 G/50ML
2 INJECTION, SOLUTION INTRAVENOUS
Status: COMPLETED | OUTPATIENT
Start: 2021-11-23 | End: 2021-11-23

## 2021-11-23 RX ORDER — ONDANSETRON HYDROCHLORIDE 2 MG/ML
INJECTION, SOLUTION INTRAMUSCULAR; INTRAVENOUS
Status: DISCONTINUED | OUTPATIENT
Start: 2021-11-23 | End: 2021-11-23

## 2021-11-23 RX ORDER — DEXAMETHASONE SODIUM PHOSPHATE 4 MG/ML
INJECTION, SOLUTION INTRA-ARTICULAR; INTRALESIONAL; INTRAMUSCULAR; INTRAVENOUS; SOFT TISSUE
Status: DISCONTINUED | OUTPATIENT
Start: 2021-11-23 | End: 2021-11-23

## 2021-11-23 RX ORDER — FENTANYL CITRATE 50 UG/ML
INJECTION, SOLUTION INTRAMUSCULAR; INTRAVENOUS
Status: DISCONTINUED | OUTPATIENT
Start: 2021-11-23 | End: 2021-11-23

## 2021-11-23 RX ORDER — IBUPROFEN 200 MG
600 TABLET ORAL EVERY 8 HOURS
Refills: 0 | COMMUNITY
Start: 2021-11-23 | End: 2021-11-26

## 2021-11-23 RX ORDER — OXYCODONE HYDROCHLORIDE 5 MG/1
5 TABLET ORAL ONCE AS NEEDED
Status: COMPLETED | OUTPATIENT
Start: 2021-11-23 | End: 2021-11-23

## 2021-11-23 RX ORDER — OXYCODONE HYDROCHLORIDE 5 MG/1
5 TABLET ORAL EVERY 6 HOURS PRN
Qty: 20 TABLET | Refills: 0 | Status: SHIPPED | OUTPATIENT
Start: 2021-11-23 | End: 2021-11-30

## 2021-11-23 RX ORDER — PROMETHAZINE HYDROCHLORIDE 25 MG/ML
INJECTION, SOLUTION INTRAMUSCULAR; INTRAVENOUS
Status: DISCONTINUED | OUTPATIENT
Start: 2021-11-23 | End: 2021-11-23

## 2021-11-23 RX ORDER — BUPIVACAINE HYDROCHLORIDE 5 MG/ML
INJECTION, SOLUTION EPIDURAL; INTRACAUDAL
Status: DISCONTINUED | OUTPATIENT
Start: 2021-11-23 | End: 2021-11-23 | Stop reason: HOSPADM

## 2021-11-23 RX ORDER — LIDOCAINE HYDROCHLORIDE 10 MG/ML
1 INJECTION, SOLUTION EPIDURAL; INFILTRATION; INTRACAUDAL; PERINEURAL ONCE
Status: DISCONTINUED | OUTPATIENT
Start: 2021-11-23 | End: 2021-11-23 | Stop reason: HOSPADM

## 2021-11-23 RX ORDER — ROCURONIUM BROMIDE 10 MG/ML
INJECTION, SOLUTION INTRAVENOUS
Status: DISCONTINUED | OUTPATIENT
Start: 2021-11-23 | End: 2021-11-23

## 2021-11-23 RX ORDER — PROPOFOL 10 MG/ML
VIAL (ML) INTRAVENOUS
Status: DISCONTINUED | OUTPATIENT
Start: 2021-11-23 | End: 2021-11-23

## 2021-11-23 RX ADMIN — CEFOXITIN SODIUM 2 G: 2 INJECTION, SOLUTION INTRAVENOUS at 08:11

## 2021-11-23 RX ADMIN — MIDAZOLAM HYDROCHLORIDE 2 MG: 1 INJECTION, SOLUTION INTRAMUSCULAR; INTRAVENOUS at 08:11

## 2021-11-23 RX ADMIN — ONDANSETRON 4 MG: 2 INJECTION, SOLUTION INTRAMUSCULAR; INTRAVENOUS at 09:11

## 2021-11-23 RX ADMIN — KETOROLAC TROMETHAMINE 15 MG: 30 INJECTION, SOLUTION INTRAMUSCULAR; INTRAVENOUS at 09:11

## 2021-11-23 RX ADMIN — SODIUM CHLORIDE, SODIUM GLUCONATE, SODIUM ACETATE, POTASSIUM CHLORIDE, MAGNESIUM CHLORIDE, SODIUM PHOSPHATE, DIBASIC, AND POTASSIUM PHOSPHATE: .53; .5; .37; .037; .03; .012; .00082 INJECTION, SOLUTION INTRAVENOUS at 08:11

## 2021-11-23 RX ADMIN — FENTANYL CITRATE 25 MCG: 50 INJECTION INTRAMUSCULAR; INTRAVENOUS at 10:11

## 2021-11-23 RX ADMIN — PROPOFOL 200 MG: 10 INJECTION, EMULSION INTRAVENOUS at 08:11

## 2021-11-23 RX ADMIN — LIDOCAINE HYDROCHLORIDE 75 MG: 20 INJECTION INTRAVENOUS at 08:11

## 2021-11-23 RX ADMIN — SUGAMMADEX 100 MG: 100 INJECTION, SOLUTION INTRAVENOUS at 09:11

## 2021-11-23 RX ADMIN — ACETAMINOPHEN 1000 MG: 10 INJECTION, SOLUTION INTRAVENOUS at 09:11

## 2021-11-23 RX ADMIN — PROMETHAZINE HYDROCHLORIDE 6.25 MG: 25 INJECTION INTRAMUSCULAR; INTRAVENOUS at 09:11

## 2021-11-23 RX ADMIN — ROCURONIUM BROMIDE 50 MG: 10 INJECTION, SOLUTION INTRAVENOUS at 08:11

## 2021-11-23 RX ADMIN — OXYCODONE 5 MG: 5 TABLET ORAL at 10:11

## 2021-11-23 RX ADMIN — ONDANSETRON 4 MG: 2 INJECTION INTRAMUSCULAR; INTRAVENOUS at 11:11

## 2021-11-23 RX ADMIN — FENTANYL CITRATE 100 MCG: 50 INJECTION, SOLUTION INTRAMUSCULAR; INTRAVENOUS at 08:11

## 2021-11-23 RX ADMIN — ONDANSETRON 4 MG: 2 INJECTION, SOLUTION INTRAMUSCULAR; INTRAVENOUS at 08:11

## 2021-11-23 RX ADMIN — DEXAMETHASONE SODIUM PHOSPHATE 4 MG: 4 INJECTION, SOLUTION INTRA-ARTICULAR; INTRALESIONAL; INTRAMUSCULAR; INTRAVENOUS; SOFT TISSUE at 08:11

## 2021-11-23 RX ADMIN — METOCLOPRAMIDE 10 MG: 5 INJECTION, SOLUTION INTRAMUSCULAR; INTRAVENOUS at 10:11

## 2021-11-24 VITALS
HEART RATE: 87 BPM | SYSTOLIC BLOOD PRESSURE: 119 MMHG | RESPIRATION RATE: 16 BRPM | BODY MASS INDEX: 24.24 KG/M2 | WEIGHT: 142 LBS | HEIGHT: 64 IN | OXYGEN SATURATION: 100 % | TEMPERATURE: 98 F | DIASTOLIC BLOOD PRESSURE: 78 MMHG

## 2021-11-29 LAB
FINAL PATHOLOGIC DIAGNOSIS: NORMAL
GROSS: NORMAL
Lab: NORMAL

## 2021-12-08 ENCOUNTER — OFFICE VISIT (OUTPATIENT)
Dept: SURGERY | Facility: CLINIC | Age: 31
End: 2021-12-08
Payer: COMMERCIAL

## 2021-12-08 VITALS — HEART RATE: 101 BPM | DIASTOLIC BLOOD PRESSURE: 85 MMHG | SYSTOLIC BLOOD PRESSURE: 122 MMHG | TEMPERATURE: 97 F

## 2021-12-08 DIAGNOSIS — Z90.49 S/P LAPAROSCOPIC CHOLECYSTECTOMY: Primary | ICD-10-CM

## 2021-12-08 PROCEDURE — 99024 PR POST-OP FOLLOW-UP VISIT: ICD-10-PCS | Mod: S$GLB,,, | Performed by: SURGERY

## 2021-12-08 PROCEDURE — 99024 POSTOP FOLLOW-UP VISIT: CPT | Mod: S$GLB,,, | Performed by: SURGERY

## 2021-12-08 RX ORDER — TINIDAZOLE 500 MG/1
2000 TABLET ORAL ONCE
COMMUNITY
Start: 2021-09-28 | End: 2022-02-14

## 2021-12-14 ENCOUNTER — PATIENT MESSAGE (OUTPATIENT)
Dept: OBSTETRICS AND GYNECOLOGY | Facility: CLINIC | Age: 31
End: 2021-12-14
Payer: COMMERCIAL

## 2022-02-14 ENCOUNTER — OFFICE VISIT (OUTPATIENT)
Dept: OBSTETRICS AND GYNECOLOGY | Facility: CLINIC | Age: 32
End: 2022-02-14
Attending: OBSTETRICS & GYNECOLOGY
Payer: COMMERCIAL

## 2022-02-14 VITALS
SYSTOLIC BLOOD PRESSURE: 122 MMHG | HEIGHT: 64 IN | DIASTOLIC BLOOD PRESSURE: 78 MMHG | BODY MASS INDEX: 25.21 KG/M2 | WEIGHT: 147.69 LBS

## 2022-02-14 DIAGNOSIS — Z01.419 ENCOUNTER FOR GYNECOLOGICAL EXAMINATION: ICD-10-CM

## 2022-02-14 DIAGNOSIS — N94.3 PMS (PREMENSTRUAL SYNDROME): Primary | ICD-10-CM

## 2022-02-14 DIAGNOSIS — Z30.41 ENCOUNTER FOR SURVEILLANCE OF CONTRACEPTIVE PILLS: ICD-10-CM

## 2022-02-14 PROCEDURE — 3074F PR MOST RECENT SYSTOLIC BLOOD PRESSURE < 130 MM HG: ICD-10-PCS | Mod: CPTII,S$GLB,, | Performed by: OBSTETRICS & GYNECOLOGY

## 2022-02-14 PROCEDURE — 1159F MED LIST DOCD IN RCRD: CPT | Mod: CPTII,S$GLB,, | Performed by: OBSTETRICS & GYNECOLOGY

## 2022-02-14 PROCEDURE — 3078F PR MOST RECENT DIASTOLIC BLOOD PRESSURE < 80 MM HG: ICD-10-PCS | Mod: CPTII,S$GLB,, | Performed by: OBSTETRICS & GYNECOLOGY

## 2022-02-14 PROCEDURE — 3078F DIAST BP <80 MM HG: CPT | Mod: CPTII,S$GLB,, | Performed by: OBSTETRICS & GYNECOLOGY

## 2022-02-14 PROCEDURE — 99999 PR PBB SHADOW E&M-EST. PATIENT-LVL III: CPT | Mod: PBBFAC,,, | Performed by: OBSTETRICS & GYNECOLOGY

## 2022-02-14 PROCEDURE — 1159F PR MEDICATION LIST DOCUMENTED IN MEDICAL RECORD: ICD-10-PCS | Mod: CPTII,S$GLB,, | Performed by: OBSTETRICS & GYNECOLOGY

## 2022-02-14 PROCEDURE — 99999 PR PBB SHADOW E&M-EST. PATIENT-LVL III: ICD-10-PCS | Mod: PBBFAC,,, | Performed by: OBSTETRICS & GYNECOLOGY

## 2022-02-14 PROCEDURE — 99395 PR PREVENTIVE VISIT,EST,18-39: ICD-10-PCS | Mod: S$GLB,,, | Performed by: OBSTETRICS & GYNECOLOGY

## 2022-02-14 PROCEDURE — 3008F PR BODY MASS INDEX (BMI) DOCUMENTED: ICD-10-PCS | Mod: CPTII,S$GLB,, | Performed by: OBSTETRICS & GYNECOLOGY

## 2022-02-14 PROCEDURE — 3008F BODY MASS INDEX DOCD: CPT | Mod: CPTII,S$GLB,, | Performed by: OBSTETRICS & GYNECOLOGY

## 2022-02-14 PROCEDURE — 99395 PREV VISIT EST AGE 18-39: CPT | Mod: S$GLB,,, | Performed by: OBSTETRICS & GYNECOLOGY

## 2022-02-14 PROCEDURE — 3074F SYST BP LT 130 MM HG: CPT | Mod: CPTII,S$GLB,, | Performed by: OBSTETRICS & GYNECOLOGY

## 2022-02-14 RX ORDER — PROGESTERONE 100 MG/1
CAPSULE ORAL
Qty: 90 CAPSULE | Refills: 3 | Status: SHIPPED | OUTPATIENT
Start: 2022-02-14

## 2022-02-14 RX ORDER — AZELASTINE 1 MG/ML
SPRAY, METERED NASAL
COMMUNITY
Start: 2022-01-17

## 2022-02-14 RX ORDER — DROSPIRENONE AND ETHINYL ESTRADIOL 0.02-3(28)
1 KIT ORAL DAILY
Qty: 84 TABLET | Refills: 3 | Status: SHIPPED | OUTPATIENT
Start: 2022-02-14

## 2022-02-14 NOTE — PROGRESS NOTES
Subjective:       Patient ID: Dana Sanz is a 31 y.o. female.    Chief Complaint:  Well Woman (Pap/hpv 12-17-20, Neg  )      History of Present Illness.  Dana Sanz is a 31 y.o. female.  She has no breast or urinary symptoms.  She has no menorrhagia, oligomenorrhea, bleeding betweeen menses, postcoital bleeding, dysmenorrhea, pelvic pain, dyspareunia, vaginal dryness, vaginal discharge, or sexual complaints.  She has no regular period but spots sometimes.  Cramps are bad when it comes.  She has PMs monthly, even on Loryna.  She is sexually active.    GYN & OB History  No LMP recorded. (Menstrual status: Birth Control).   Last Pap: 2021 Normal HPV negative  Gardasil:  Yes    OB History    Para Term  AB Living   3 3 3     3   SAB IAB Ectopic Multiple Live Births           3      # Outcome Date GA Lbr Fabien/2nd Weight Sex Delivery Anes PTL Lv   3 Term 16 38w0d  3.657 kg (8 lb 1 oz) M CS-LTranv Spinal N THANG      Complications: PROM (premature rupture of membranes), Previous  section   2 Term 14 37w0d  3.062 kg (6 lb 12 oz) M CS-LTranv Spinal  THANG      Complications: Hypertension affecting pregnancy   1 Term 13 38w0d  3.856 kg (8 lb 8 oz) M CS-LTranv Spinal  THANG      Complications: Failure to Progress in Second Stage, Pre-eclampsia      Obstetric Comments   menarche 12       Past Medical History:   Diagnosis Date    Hyperinsulinemia     Hyperlipidemia     Oral contraceptive use     Regulate periods w/ PCOS     PCOS (polycystic ovarian syndrome)     Relies on partner vasectomy for contraception      Past Surgical History:   Procedure Laterality Date    APPENDECTOMY  2008     SECTION  , , 2016    CHOLECYSTECTOMY  2021    LAPAROSCOPIC CHOLECYSTECTOMY N/A 2021    Procedure: CHOLECYSTECTOMY, LAPAROSCOPIC;  Surgeon: Neville Anderson Jr., MD;  Location: [facility];  Service: General;  Laterality: N/A;    TONSILLECTOMY       Family History  "  Problem Relation Age of Onset    Hypertension Mother     Coronary artery disease Mother     Liver cancer Maternal Grandmother     Lung cancer Maternal Grandfather     Hypertension Brother     No Known Problems Sister     No Known Problems Brother     Ovarian cancer Neg Hx     Colon cancer Neg Hx     Breast cancer Neg Hx     Diabetes Neg Hx     Stroke Neg Hx      Social History     Tobacco Use    Smoking status: Never Smoker    Smokeless tobacco: Never Used   Substance Use Topics    Alcohol use: Yes     Comment: occasional    Drug use: No       Current Outpatient Medications:     azelastine (ASTELIN) 137 mcg (0.1 %) nasal spray, SMARTSIG:Both Nares, Disp: , Rfl:     cyanocobalamin, vitamin B-12, (VITAMIN B-12 ORAL), Take 1 tablet by mouth once daily. , Disp: , Rfl:     ergocalciferol, vitamin D2, (VITAMIN D ORAL), Take 1 capsule by mouth once daily. , Disp: , Rfl:     metFORMIN (GLUCOPHAGE-XR) 500 MG ER 24hr tablet, TAKE 2 TABLETS AT BREAKFAST & 2 TABLETS AT DINNER, Disp: 360 tablet, Rfl: 3    spironolactone (ALDACTONE) 50 MG tablet, Take 1 tablet (50 mg total) by mouth once daily., Disp: 90 tablet, Rfl: 1    VICTOZA 2-CHANO 0.6 mg/0.1 mL (18 mg/3 mL) PnIj pen, Inject 1.2 mg into the skin once daily. , Disp: , Rfl:     b complex vitamins capsule, Take 1 capsule by mouth once daily., Disp: , Rfl:     dicyclomine (BENTYL) 10 MG capsule, Take by mouth., Disp: , Rfl:     drospirenone-ethinyl estradioL (LORYNA, 28,) 3-0.02 mg per tablet, Take 1 tablet by mouth once daily., Disp: 84 tablet, Rfl: 3    fluticasone propionate (FLONASE) 50 mcg/actuation nasal spray, by Each Nostril route., Disp: , Rfl:     NOVOFINE PLUS 32 gauge x 1/6" Ndle, Inject into the skin., Disp: , Rfl:     progesterone (PROMETRIUM) 100 MG capsule, Take 1 capsule by mouth 30-60 minutes before bed every night, Disp: 90 capsule, Rfl: 3    Review of patient's allergies indicates:  No Known Allergies    Review of Systems  Review " "of Systems   Constitutional: Negative for fatigue.   HENT: Negative for trouble swallowing.    Eyes: Negative for visual disturbance.   Respiratory: Negative for cough and shortness of breath.    Cardiovascular: Negative for chest pain.   Gastrointestinal: Negative for abdominal distention, abdominal pain, blood in stool, nausea and vomiting.   Genitourinary: Negative for difficulty urinating, dyspareunia, dysuria, flank pain, frequency, hematuria, pelvic pain, urgency, vaginal bleeding, vaginal discharge and vaginal pain.   Musculoskeletal: Negative for arthralgias.   Skin: Negative for rash.   Neurological: Negative for dizziness and headaches.   Psychiatric/Behavioral: Negative for sleep disturbance. The patient is not nervous/anxious.         Objective:     Vitals:    02/14/22 0936   BP: 122/78   Weight: 67 kg (147 lb 11.3 oz)   Height: 5' 4" (1.626 m)   PainSc: 0-No pain     Body mass index is 25.35 kg/m².      Physical Exam:   Constitutional: She is oriented to person, place, and time. Vital signs are normal. She appears well-developed and well-nourished.    HENT:   Head: Normocephalic.     Neck: No thyromegaly present.     Pulmonary/Chest: Right breast exhibits no mass, no nipple discharge, no skin change, no tenderness and no swelling. Left breast exhibits no mass, no nipple discharge, no skin change, no tenderness and no swelling. Breasts are symmetrical.        Abdominal: Soft. Normal appearance and bowel sounds are normal. She exhibits no distension. There is no abdominal tenderness.     Genitourinary:    Vagina and uterus normal.      Pelvic exam was performed with patient supine.   There is no rash, tenderness, lesion or injury on the right labia. There is no rash, tenderness, lesion or injury on the left labia. Cervix is normal. Right adnexum displays no mass, no tenderness and no fullness. Left adnexum displays no mass, no tenderness and no fullness. No erythema or  no vaginal discharge in the vagina. " Cervix exhibits no motion tenderness and no discharge.           Musculoskeletal: Normal range of motion.      Lymphadenopathy:        Right: No inguinal and no supraclavicular adenopathy present.        Left: No inguinal and no supraclavicular adenopathy present.    Neurological: She is alert and oriented to person, place, and time.    Skin: Skin is warm and dry.    Psychiatric: She has a normal mood and affect.        Assessment/ Plan:     PMS (premenstrual syndrome)  -     progesterone (PROMETRIUM) 100 MG capsule; Take 1 capsule by mouth 30-60 minutes before bed every night  Dispense: 90 capsule; Refill: 3    Encounter for gynecological examination    Encounter for surveillance of contraceptive pills  -     drospirenone-ethinyl estradioL (LORYNA, 28,) 3-0.02 mg per tablet; Take 1 tablet by mouth once daily.  Dispense: 84 tablet; Refill: 3      Declined COVID vax.  Routine pap smears.  Self breast exam  Diet and exercise discussed.  Gardasil discussed.  Contraception reviewed/discussed.  Start progesterone 100 mg PO QD  Follow-up with me in 4 months.

## 2022-02-23 ENCOUNTER — PATIENT MESSAGE (OUTPATIENT)
Dept: OBSTETRICS AND GYNECOLOGY | Facility: CLINIC | Age: 32
End: 2022-02-23
Payer: COMMERCIAL

## 2022-02-25 ENCOUNTER — PATIENT MESSAGE (OUTPATIENT)
Dept: OBSTETRICS AND GYNECOLOGY | Facility: CLINIC | Age: 32
End: 2022-02-25
Payer: COMMERCIAL

## 2023-01-29 ENCOUNTER — HOSPITAL ENCOUNTER (EMERGENCY)
Facility: HOSPITAL | Age: 33
Discharge: HOME | End: 2023-01-29
Attending: EMERGENCY MEDICINE
Payer: COMMERCIAL

## 2023-01-29 VITALS
DIASTOLIC BLOOD PRESSURE: 86 MMHG | SYSTOLIC BLOOD PRESSURE: 139 MMHG | OXYGEN SATURATION: 100 % | WEIGHT: 145 LBS | TEMPERATURE: 98 F | HEART RATE: 88 BPM | HEIGHT: 64 IN | BODY MASS INDEX: 24.75 KG/M2 | RESPIRATION RATE: 16 BRPM

## 2023-01-29 DIAGNOSIS — R00.2 PALPITATIONS: Primary | ICD-10-CM

## 2023-01-29 LAB
ALBUMIN SERPL BCP-MCNC: 4.1 G/DL (ref 3.5–5.2)
ALP SERPL-CCNC: 51 U/L (ref 55–135)
ALT SERPL W/O P-5'-P-CCNC: 28 U/L (ref 10–44)
ANION GAP SERPL CALC-SCNC: 12 MMOL/L (ref 8–16)
AST SERPL-CCNC: 18 U/L (ref 10–40)
B-HCG UR QL: NEGATIVE
BASOPHILS # BLD AUTO: 0.04 K/UL (ref 0–0.2)
BASOPHILS NFR BLD: 0.4 % (ref 0–1.9)
BILIRUB SERPL-MCNC: 0.5 MG/DL (ref 0.1–1)
BNP SERPL-MCNC: 13 PG/ML (ref 0–99)
BUN SERPL-MCNC: 12 MG/DL (ref 6–20)
CALCIUM SERPL-MCNC: 9.6 MG/DL (ref 8.7–10.5)
CHLORIDE SERPL-SCNC: 98 MMOL/L (ref 95–110)
CO2 SERPL-SCNC: 24 MMOL/L (ref 23–29)
CREAT SERPL-MCNC: 0.8 MG/DL (ref 0.5–1.4)
CTP QC/QA: YES
D DIMER PPP IA.FEU-MCNC: 0.19 MG/L FEU
DIFFERENTIAL METHOD: NORMAL
EOSINOPHIL # BLD AUTO: 0.1 K/UL (ref 0–0.5)
EOSINOPHIL NFR BLD: 1 % (ref 0–8)
ERYTHROCYTE [DISTWIDTH] IN BLOOD BY AUTOMATED COUNT: 13.2 % (ref 11.5–14.5)
EST. GFR  (NO RACE VARIABLE): >60 ML/MIN/1.73 M^2
GLUCOSE SERPL-MCNC: 94 MG/DL (ref 70–110)
HCT VFR BLD AUTO: 42.5 % (ref 37–48.5)
HGB BLD-MCNC: 13.9 G/DL (ref 12–16)
IMM GRANULOCYTES # BLD AUTO: 0.03 K/UL (ref 0–0.04)
IMM GRANULOCYTES NFR BLD AUTO: 0.3 % (ref 0–0.5)
LYMPHOCYTES # BLD AUTO: 2.4 K/UL (ref 1–4.8)
LYMPHOCYTES NFR BLD: 24 % (ref 18–48)
MAGNESIUM SERPL-MCNC: 1.8 MG/DL (ref 1.6–2.6)
MCH RBC QN AUTO: 28.8 PG (ref 27–31)
MCHC RBC AUTO-ENTMCNC: 32.7 G/DL (ref 32–36)
MCV RBC AUTO: 88 FL (ref 82–98)
MONOCYTES # BLD AUTO: 0.6 K/UL (ref 0.3–1)
MONOCYTES NFR BLD: 5.4 % (ref 4–15)
NEUTROPHILS # BLD AUTO: 7 K/UL (ref 1.8–7.7)
NEUTROPHILS NFR BLD: 68.9 % (ref 38–73)
NRBC BLD-RTO: 0 /100 WBC
PLATELET # BLD AUTO: 308 K/UL (ref 150–450)
PMV BLD AUTO: 10.1 FL (ref 9.2–12.9)
POTASSIUM SERPL-SCNC: 3.3 MMOL/L (ref 3.5–5.1)
PROT SERPL-MCNC: 8.1 G/DL (ref 6–8.4)
RBC # BLD AUTO: 4.83 M/UL (ref 4–5.4)
SARS-COV-2 RDRP RESP QL NAA+PROBE: NEGATIVE
SODIUM SERPL-SCNC: 134 MMOL/L (ref 136–145)
TROPONIN I SERPL HS-MCNC: 3.1 PG/ML (ref 0–14.9)
TROPONIN I SERPL HS-MCNC: <2.3 PG/ML (ref 0–14.9)
TSH SERPL DL<=0.005 MIU/L-ACNC: 1.61 UIU/ML (ref 0.34–5.6)
WBC # BLD AUTO: 10.16 K/UL (ref 3.9–12.7)

## 2023-01-29 PROCEDURE — 83880 ASSAY OF NATRIURETIC PEPTIDE: CPT | Performed by: EMERGENCY MEDICINE

## 2023-01-29 PROCEDURE — 84443 ASSAY THYROID STIM HORMONE: CPT | Performed by: EMERGENCY MEDICINE

## 2023-01-29 PROCEDURE — 93005 ELECTROCARDIOGRAM TRACING: CPT | Performed by: INTERNAL MEDICINE

## 2023-01-29 PROCEDURE — 99284 EMERGENCY DEPT VISIT MOD MDM: CPT | Mod: 25

## 2023-01-29 PROCEDURE — 25000003 PHARM REV CODE 250: Performed by: EMERGENCY MEDICINE

## 2023-01-29 PROCEDURE — 96365 THER/PROPH/DIAG IV INF INIT: CPT

## 2023-01-29 PROCEDURE — 63600175 PHARM REV CODE 636 W HCPCS: Performed by: EMERGENCY MEDICINE

## 2023-01-29 PROCEDURE — 93010 ELECTROCARDIOGRAM REPORT: CPT | Mod: ,,, | Performed by: INTERNAL MEDICINE

## 2023-01-29 PROCEDURE — 96376 TX/PRO/DX INJ SAME DRUG ADON: CPT

## 2023-01-29 PROCEDURE — 81025 URINE PREGNANCY TEST: CPT | Performed by: EMERGENCY MEDICINE

## 2023-01-29 PROCEDURE — 85379 FIBRIN DEGRADATION QUANT: CPT | Performed by: EMERGENCY MEDICINE

## 2023-01-29 PROCEDURE — U0002 COVID-19 LAB TEST NON-CDC: HCPCS | Performed by: EMERGENCY MEDICINE

## 2023-01-29 PROCEDURE — 80053 COMPREHEN METABOLIC PANEL: CPT | Performed by: EMERGENCY MEDICINE

## 2023-01-29 PROCEDURE — 83735 ASSAY OF MAGNESIUM: CPT | Performed by: EMERGENCY MEDICINE

## 2023-01-29 PROCEDURE — 84484 ASSAY OF TROPONIN QUANT: CPT | Mod: 91 | Performed by: EMERGENCY MEDICINE

## 2023-01-29 PROCEDURE — 85025 COMPLETE CBC W/AUTO DIFF WBC: CPT | Performed by: EMERGENCY MEDICINE

## 2023-01-29 PROCEDURE — 93010 EKG 12-LEAD: ICD-10-PCS | Mod: ,,, | Performed by: INTERNAL MEDICINE

## 2023-01-29 RX ORDER — MAGNESIUM SULFATE 1 G/100ML
1 INJECTION INTRAVENOUS ONCE
Status: COMPLETED | OUTPATIENT
Start: 2023-01-29 | End: 2023-01-29

## 2023-01-29 RX ORDER — POTASSIUM CHLORIDE 20 MEQ/1
40 TABLET, EXTENDED RELEASE ORAL ONCE
Status: COMPLETED | OUTPATIENT
Start: 2023-01-29 | End: 2023-01-29

## 2023-01-29 RX ORDER — TIRZEPATIDE 5 MG/.5ML
5 INJECTION, SOLUTION SUBCUTANEOUS WEEKLY
COMMUNITY
Start: 2022-11-04

## 2023-01-29 RX ADMIN — MAGNESIUM SULFATE 1 G: 1 INJECTION INTRAVENOUS at 03:01

## 2023-01-29 RX ADMIN — POTASSIUM CHLORIDE 40 MEQ: 1500 TABLET, EXTENDED RELEASE ORAL at 03:01

## 2023-01-29 RX ADMIN — MAGNESIUM SULFATE 1 G: 1 INJECTION INTRAVENOUS at 05:01

## 2023-01-29 RX ADMIN — SODIUM CHLORIDE, SODIUM LACTATE, POTASSIUM CHLORIDE, AND CALCIUM CHLORIDE 1000 ML: .6; .31; .03; .02 INJECTION, SOLUTION INTRAVENOUS at 05:01

## 2023-01-29 NOTE — ED PROVIDER NOTES
Encounter Date: 2023       History     Chief Complaint   Patient presents with    Palpitations     Around 1pm she was laying on the couch and started feeling her heart race, her hands and feet are tingling, not tachypneic, has had anxiety in the past, lightheaded at times.      32-year-old female with history of hyperlipidemia, polycystic ovarian syndrome, Crohn's disease.  Patient presents emergency department with complaint of palpitations which began around 1:00 a.m..  Patient states that she felt like her heart was racing and at that time noticed that she had tingling to her hands and feet.  Patient states that she felt slightly lightheaded as well.  She denied chest pain, no abdominal pain, no shortness of breath, denies any other constitutional symptoms.  Denied any recent illness.  Currently feels like her heart is not racing but she still has tingling noted to her hands and feet.    Review of patient's allergies indicates:  No Known Allergies  Past Medical History:   Diagnosis Date    Hyperinsulinemia     Hyperlipidemia     Oral contraceptive use     Regulate periods w/ PCOS     PCOS (polycystic ovarian syndrome)     Relies on partner vasectomy for contraception      Past Surgical History:   Procedure Laterality Date    APPENDECTOMY  2008     SECTION  , ,     CHOLECYSTECTOMY  2021    LAPAROSCOPIC CHOLECYSTECTOMY N/A 2021    Procedure: CHOLECYSTECTOMY, LAPAROSCOPIC;  Surgeon: Neville Anderson Jr., MD;  Location: [facility];  Service: General;  Laterality: N/A;    TONSILLECTOMY       Family History   Problem Relation Age of Onset    Hypertension Mother     Coronary artery disease Mother     Liver cancer Maternal Grandmother     Lung cancer Maternal Grandfather     Hypertension Brother     No Known Problems Sister     No Known Problems Brother     Ovarian cancer Neg Hx     Colon cancer Neg Hx     Breast cancer Neg Hx     Diabetes Neg Hx     Stroke Neg Hx      Social  History     Tobacco Use    Smoking status: Never    Smokeless tobacco: Never   Substance Use Topics    Alcohol use: Yes     Comment: occasional    Drug use: No     Review of Systems   Constitutional:  Negative for fever.   HENT:  Negative for sore throat.    Respiratory:  Negative for chest tightness and shortness of breath.    Cardiovascular:  Positive for palpitations. Negative for chest pain.   Gastrointestinal:  Negative for abdominal pain, nausea and vomiting.   Genitourinary:  Negative for dysuria.   Musculoskeletal:  Negative for back pain.   Skin:  Negative for rash.   Neurological:  Positive for light-headedness and numbness. Negative for dizziness, seizures, weakness and headaches.   Hematological:  Does not bruise/bleed easily.   Psychiatric/Behavioral:  The patient is nervous/anxious.      Physical Exam     Initial Vitals [01/29/23 1410]   BP Pulse Resp Temp SpO2   (!) 149/101 88 16 98 °F (36.7 °C) 99 %      MAP       --         Physical Exam    Nursing note and vitals reviewed.  Constitutional: She appears well-developed and well-nourished.   HENT:   Head: Normocephalic and atraumatic.   Nose: Nose normal.   Mouth/Throat: Oropharynx is clear and moist.   Eyes: Conjunctivae and EOM are normal. Pupils are equal, round, and reactive to light.   Neck: Neck supple. No thyromegaly present. No tracheal deviation present.   Normal range of motion.  Cardiovascular:  Normal rate, regular rhythm, normal heart sounds and intact distal pulses.     Exam reveals no gallop and no friction rub.       No murmur heard.  Mid systolic click noted at left lower sternal border.  Normal S1, S2   Pulmonary/Chest: No stridor. No respiratory distress.   Course bilateral breath sounds no adventitious sounds   Abdominal: Abdomen is soft. Bowel sounds are normal. She exhibits no mass. There is no rebound and no guarding.   Musculoskeletal:         General: No edema. Normal range of motion.      Cervical back: Normal range of motion  and neck supple.     Lymphadenopathy:     She has no cervical adenopathy.   Neurological: She is alert and oriented to person, place, and time. She has normal strength and normal reflexes. GCS score is 15. GCS eye subscore is 4. GCS verbal subscore is 5. GCS motor subscore is 6.   Skin: Skin is warm and dry. Capillary refill takes less than 2 seconds.   Psychiatric: She has a normal mood and affect.       ED Course   Procedures  Labs Reviewed   COMPREHENSIVE METABOLIC PANEL - Abnormal; Notable for the following components:       Result Value    Sodium 134 (*)     Potassium 3.3 (*)     Alkaline Phosphatase 51 (*)     All other components within normal limits   CBC W/ AUTO DIFFERENTIAL   TROPONIN I HIGH SENSITIVITY   B-TYPE NATRIURETIC PEPTIDE   MAGNESIUM   TSH   D DIMER, QUANTITATIVE   SARS-COV-2 RNA AMPLIFICATION, QUAL   TROPONIN I HIGH SENSITIVITY   POCT URINE PREGNANCY     EKG Readings: (Independently Interpreted)   Initial Reading: No STEMI. Rhythm: Normal Sinus Rhythm. Heart Rate: 83. Ectopy: No Ectopy. Axis: Normal.   Eighty-three, normal sinus rhythm, normal axis, no ischemic changes noted.   ECG Results              EKG 12-lead (Final result)  Result time 01/29/23 16:16:13      Final result by Interface, Lab In [facility] (01/29/23 16:16:13)                   Narrative:    Test Reason : R07.9,    Vent. Rate : 083 BPM     Atrial Rate : 083 BPM     P-R Int : 128 ms          QRS Dur : 084 ms      QT Int : 384 ms       P-R-T Axes : 075 072 048 degrees     QTc Int : 451 ms    Normal sinus rhythm  Normal ECG  No previous ECGs available  Confirmed by Rc Chavez MD ([id number]) on 1/29/2023 4:16:05 PM    Referred By: [email or web address]   SELF           Confirmed By:Rc Chavez MD                                  Imaging Results              X-Ray Chest AP Portable (Final result)  Result time 01/29/23 15:46:26      Final result by Tito Crook MD (01/29/23 15:46:26)                   Narrative:    Reason: Chest  Pain    FINDINGS:    Portable chest without comparisons show normal cardiomediastinal silhouette.  Lungs are clear. Pulmonary vasculature is normal. No acute osseous abnormality.    IMPRESSION:    No acute cardiopulmonary abnormality.    Electronically signed by:  Tito Crook DO  1/29/2023 3:46 PM [facility] Workstation: [id number]                                     Medications   magnesium sulfate in dextrose IVPB (premix) 1 g (1 g Intravenous New Bag 1/29/23 1721)   magnesium sulfate in dextrose IVPB (premix) 1 g (0 g Intravenous Stopped 1/29/23 1641)   potassium chloride SA CR tablet 40 mEq (40 mEq Oral Given 1/29/23 1540)   lactated ringers bolus 1,000 mL (1,000 mLs Intravenous New Bag 1/29/23 1708)     Medical Decision Making:   Initial Assessment:   32-year-old female with history of hyperlipidemia, polycystic ovarian syndrome, Crohn's disease.  Patient presents emergency department with complaint of palpitations which began around 1:00 a.m..  Patient states that she felt like her heart was racing and at that time noticed that she had tingling to her hands and feet.  Patient states that she felt slightly lightheaded as well.  She denied chest pain, no abdominal pain, no shortness of breath, denies any other constitutional symptoms.  Denied any recent illness.  Currently feels like her heart is not racing but she still has tingling noted to her hands and feet.    Differential Diagnosis:   Arrhythmia, anxiety, volume depletion, electrolyte abnormality, hypomagnesemia, hypokalemia, hyponatremia, hyperthyroidism  Clinical Tests:   Lab Tests: Ordered and Reviewed  Radiological Study: Ordered and Reviewed  Medical Tests: Ordered and Reviewed                        Clinical Impression:   Final diagnoses:  [R00.2] Palpitations (Primary)               Quinn Quiroz MD  01/29/23 1518       Quinn Quiroz MD  01/29/23 1819

## 2023-08-22 NOTE — TELEPHONE ENCOUNTER
Pt has been on Minastrin ( in CCD) and reports taking Sumi.    Do you want to see her again to discuss OCP since she is reluctant to get the Jennifer IUD?  She is requesting a low dose OCP   SANE nurse at bedside

## 2024-08-28 DIAGNOSIS — A69.22 ACUTE LYME DISEASE WITH NEUROLOGICAL DISEASE: Primary | ICD-10-CM

## 2024-08-28 RX ORDER — HEPARIN 100 UNIT/ML
500 SYRINGE INTRAVENOUS
Status: CANCELLED | OUTPATIENT
Start: 2024-08-28

## 2024-08-28 RX ORDER — SODIUM CHLORIDE 0.9 % (FLUSH) 0.9 %
10 SYRINGE (ML) INJECTION
Status: CANCELLED | OUTPATIENT
Start: 2024-08-28

## 2024-08-30 ENCOUNTER — CLINICAL SUPPORT (OUTPATIENT)
Dept: NURSING | Facility: HOSPITAL | Age: 34
End: 2024-08-30
Payer: COMMERCIAL

## 2024-08-30 ENCOUNTER — INFUSION (OUTPATIENT)
Dept: INFUSION THERAPY | Facility: HOSPITAL | Age: 34
End: 2024-08-30
Payer: COMMERCIAL

## 2024-08-30 ENCOUNTER — HOSPITAL ENCOUNTER (OUTPATIENT)
Dept: RADIOLOGY | Facility: HOSPITAL | Age: 34
Discharge: HOME | End: 2024-08-30
Payer: COMMERCIAL

## 2024-08-30 VITALS
OXYGEN SATURATION: 99 % | TEMPERATURE: 99 F | RESPIRATION RATE: 16 BRPM | HEART RATE: 76 BPM | DIASTOLIC BLOOD PRESSURE: 73 MMHG | SYSTOLIC BLOOD PRESSURE: 115 MMHG

## 2024-08-30 VITALS — HEIGHT: 64 IN | BODY MASS INDEX: 26.53 KG/M2 | WEIGHT: 155.38 LBS

## 2024-08-30 DIAGNOSIS — A69.22 ACUTE LYME DISEASE WITH NEUROLOGICAL DISEASE: ICD-10-CM

## 2024-08-30 DIAGNOSIS — A69.22 ACUTE LYME DISEASE WITH NEUROLOGICAL DISEASE: Primary | ICD-10-CM

## 2024-08-30 PROCEDURE — 63600175 PHARM REV CODE 636 W HCPCS: Performed by: INTERNAL MEDICINE

## 2024-08-30 PROCEDURE — A4216 STERILE WATER/SALINE, 10 ML: HCPCS | Performed by: INTERNAL MEDICINE

## 2024-08-30 PROCEDURE — 76937 US GUIDE VASCULAR ACCESS: CPT

## 2024-08-30 PROCEDURE — C1751 CATH, INF, PER/CENT/MIDLINE: HCPCS

## 2024-08-30 PROCEDURE — 96365 THER/PROPH/DIAG IV INF INIT: CPT

## 2024-08-30 PROCEDURE — 36569 INSJ PICC 5 YR+ W/O IMAGING: CPT

## 2024-08-30 PROCEDURE — 25000003 PHARM REV CODE 250: Performed by: INTERNAL MEDICINE

## 2024-08-30 PROCEDURE — 71045 X-RAY EXAM CHEST 1 VIEW: CPT | Mod: 26,,, | Performed by: RADIOLOGY

## 2024-08-30 RX ORDER — HEPARIN 100 UNIT/ML
500 SYRINGE INTRAVENOUS
Status: DISCONTINUED | OUTPATIENT
Start: 2024-08-30 | End: 2024-08-30 | Stop reason: HOSPADM

## 2024-08-30 RX ORDER — SODIUM CHLORIDE 0.9 % (FLUSH) 0.9 %
10 SYRINGE (ML) INJECTION
OUTPATIENT
Start: 2024-09-02

## 2024-08-30 RX ORDER — SODIUM CHLORIDE 0.9 % (FLUSH) 0.9 %
10 SYRINGE (ML) INJECTION
Status: DISCONTINUED | OUTPATIENT
Start: 2024-08-30 | End: 2024-08-30 | Stop reason: HOSPADM

## 2024-08-30 RX ORDER — HEPARIN 100 UNIT/ML
500 SYRINGE INTRAVENOUS
OUTPATIENT
Start: 2024-09-02

## 2024-08-30 RX ADMIN — HEPARIN 500 UNITS: 100 SYRINGE at 11:08

## 2024-08-30 RX ADMIN — Medication 10 ML: at 11:08

## 2024-08-30 RX ADMIN — CEFTRIAXONE SODIUM 2 G: 2 INJECTION, POWDER, FOR SOLUTION INTRAMUSCULAR; INTRAVENOUS at 10:08

## 2024-08-30 NOTE — PROCEDURES
Dana Sanz is a 34 y.o. female patient.         PICC  Date/Time: 8/30/2024 10:30 AM  Performed by: Cal Rojas RN  Supervising provider: Caryl Jackman RN  Consent Done: Yes  Time out: Immediately prior to procedure a time out was called to verify the correct patient, procedure, equipment, support staff and site/side marked as required  Indications: med administration  Anesthesia: local infiltration  Local anesthetic: lidocaine 1% without epinephrine  Anesthetic Total (mL): 5  Preparation: skin prepped with ChloraPrep  Skin prep agent dried: skin prep agent completely dried prior to procedure  Sterile barriers: all five maximum sterile barriers used - cap, mask, sterile gown, sterile gloves, and large sterile sheet  Hand hygiene: hand hygiene performed prior to central venous catheter insertion  Location details: right basilic  Catheter type: double lumen  Catheter size: 5 Fr  Catheter Length: 36cm    Ultrasound guidance: yes  Vessel Caliber: large and patent, compressibility normal  Needle advanced into vessel with real time Ultrasound guidance.  Guidewire confirmed in vessel.  Image recorded and saved.  Sterile sheath used.  Number of attempts: 1  Post-procedure: blood return through all ports, chlorhexidine patch and sterile dressing applied  Technical procedures used: MARCIE RYAN 3CG  Estimated blood loss (mL): 1  Specimens: No  Implants: No  Assessment: placement verified by x-ray, successful placement and tip termination          Name Cal Rojas RN  8/30/2024

## 2024-09-04 ENCOUNTER — INFUSION (OUTPATIENT)
Dept: INFUSION THERAPY | Facility: HOSPITAL | Age: 34
End: 2024-09-04
Attending: NURSE PRACTITIONER
Payer: COMMERCIAL

## 2024-09-04 VITALS
TEMPERATURE: 98 F | OXYGEN SATURATION: 100 % | DIASTOLIC BLOOD PRESSURE: 89 MMHG | SYSTOLIC BLOOD PRESSURE: 143 MMHG | RESPIRATION RATE: 16 BRPM | HEART RATE: 86 BPM

## 2024-09-04 DIAGNOSIS — A69.22 ACUTE LYME DISEASE WITH NEUROLOGICAL DISEASE: Primary | ICD-10-CM

## 2024-09-04 LAB
ALBUMIN SERPL BCP-MCNC: 3.5 G/DL (ref 3.5–5.2)
ALP SERPL-CCNC: 72 U/L (ref 55–135)
ALT SERPL W/O P-5'-P-CCNC: 27 U/L (ref 10–44)
ANION GAP SERPL CALC-SCNC: 10 MMOL/L (ref 8–16)
AST SERPL-CCNC: 16 U/L (ref 10–40)
BASOPHILS # BLD AUTO: 0.04 K/UL (ref 0–0.2)
BASOPHILS NFR BLD: 0.4 % (ref 0–1.9)
BILIRUB SERPL-MCNC: 0.2 MG/DL (ref 0.1–1)
BUN SERPL-MCNC: 10 MG/DL (ref 6–20)
CALCIUM SERPL-MCNC: 9.2 MG/DL (ref 8.7–10.5)
CHLORIDE SERPL-SCNC: 103 MMOL/L (ref 95–110)
CO2 SERPL-SCNC: 22 MMOL/L (ref 23–29)
CREAT SERPL-MCNC: 0.8 MG/DL (ref 0.5–1.4)
CRP SERPL-MCNC: 3.1 MG/L (ref 0–8.2)
DIFFERENTIAL METHOD BLD: NORMAL
EOSINOPHIL # BLD AUTO: 0.1 K/UL (ref 0–0.5)
EOSINOPHIL NFR BLD: 1 % (ref 0–8)
ERYTHROCYTE [DISTWIDTH] IN BLOOD BY AUTOMATED COUNT: 13 % (ref 11.5–14.5)
ERYTHROCYTE [SEDIMENTATION RATE] IN BLOOD BY WESTERGREN METHOD: 15 MM/HR (ref 0–20)
EST. GFR  (NO RACE VARIABLE): >60 ML/MIN/1.73 M^2
GLUCOSE SERPL-MCNC: 86 MG/DL (ref 70–110)
HCT VFR BLD AUTO: 39.6 % (ref 37–48.5)
HGB BLD-MCNC: 13 G/DL (ref 12–16)
IMM GRANULOCYTES # BLD AUTO: 0.03 K/UL (ref 0–0.04)
IMM GRANULOCYTES NFR BLD AUTO: 0.3 % (ref 0–0.5)
LYMPHOCYTES # BLD AUTO: 2.2 K/UL (ref 1–4.8)
LYMPHOCYTES NFR BLD: 23.2 % (ref 18–48)
MCH RBC QN AUTO: 28.4 PG (ref 27–31)
MCHC RBC AUTO-ENTMCNC: 32.8 G/DL (ref 32–36)
MCV RBC AUTO: 87 FL (ref 82–98)
MONOCYTES # BLD AUTO: 0.5 K/UL (ref 0.3–1)
MONOCYTES NFR BLD: 4.7 % (ref 4–15)
NEUTROPHILS # BLD AUTO: 6.8 K/UL (ref 1.8–7.7)
NEUTROPHILS NFR BLD: 70.4 % (ref 38–73)
NRBC BLD-RTO: 0 /100 WBC
PLATELET # BLD AUTO: 243 K/UL (ref 150–450)
PMV BLD AUTO: 10.3 FL (ref 9.2–12.9)
POTASSIUM SERPL-SCNC: 4.2 MMOL/L (ref 3.5–5.1)
PROT SERPL-MCNC: 7.1 G/DL (ref 6–8.4)
RBC # BLD AUTO: 4.57 M/UL (ref 4–5.4)
SODIUM SERPL-SCNC: 135 MMOL/L (ref 136–145)
WBC # BLD AUTO: 9.61 K/UL (ref 3.9–12.7)

## 2024-09-04 PROCEDURE — A4216 STERILE WATER/SALINE, 10 ML: HCPCS | Performed by: INTERNAL MEDICINE

## 2024-09-04 PROCEDURE — 86140 C-REACTIVE PROTEIN: CPT | Performed by: INTERNAL MEDICINE

## 2024-09-04 PROCEDURE — 36592 COLLECT BLOOD FROM PICC: CPT

## 2024-09-04 PROCEDURE — 25000003 PHARM REV CODE 250: Performed by: INTERNAL MEDICINE

## 2024-09-04 PROCEDURE — 80053 COMPREHEN METABOLIC PANEL: CPT | Performed by: INTERNAL MEDICINE

## 2024-09-04 PROCEDURE — 85651 RBC SED RATE NONAUTOMATED: CPT | Performed by: INTERNAL MEDICINE

## 2024-09-04 PROCEDURE — 99211 OFF/OP EST MAY X REQ PHY/QHP: CPT | Mod: 25

## 2024-09-04 PROCEDURE — 85025 COMPLETE CBC W/AUTO DIFF WBC: CPT | Performed by: INTERNAL MEDICINE

## 2024-09-04 PROCEDURE — 63600175 PHARM REV CODE 636 W HCPCS: Performed by: INTERNAL MEDICINE

## 2024-09-04 RX ORDER — HEPARIN 100 UNIT/ML
500 SYRINGE INTRAVENOUS
OUTPATIENT
Start: 2024-09-09

## 2024-09-04 RX ORDER — SODIUM CHLORIDE 0.9 % (FLUSH) 0.9 %
10 SYRINGE (ML) INJECTION
OUTPATIENT
Start: 2024-09-09

## 2024-09-04 RX ORDER — HEPARIN 100 UNIT/ML
500 SYRINGE INTRAVENOUS
Status: DISCONTINUED | OUTPATIENT
Start: 2024-09-04 | End: 2024-09-04 | Stop reason: HOSPADM

## 2024-09-04 RX ORDER — SODIUM CHLORIDE 0.9 % (FLUSH) 0.9 %
10 SYRINGE (ML) INJECTION
Status: DISCONTINUED | OUTPATIENT
Start: 2024-09-04 | End: 2024-09-04 | Stop reason: HOSPADM

## 2024-09-04 RX ADMIN — Medication 10 ML: at 09:09

## 2024-09-04 RX ADMIN — HEPARIN 500 UNITS: 100 SYRINGE at 09:09

## 2024-09-04 NOTE — PLAN OF CARE
Problem: Adult Inpatient Plan of Care  Goal: Optimal Comfort and Wellbeing  Outcome: Progressing  Intervention: Provide Person-Centered Care  Flowsheets (Taken 9/4/2024 2736)  Trust Relationship/Rapport:   care explained   choices provided   emotional support provided   empathic listening provided   questions answered   questions encouraged   reassurance provided   thoughts/feelings acknowledged

## 2024-09-09 ENCOUNTER — INFUSION (OUTPATIENT)
Dept: INFUSION THERAPY | Facility: HOSPITAL | Age: 34
End: 2024-09-09
Attending: INTERNAL MEDICINE
Payer: COMMERCIAL

## 2024-09-09 VITALS
DIASTOLIC BLOOD PRESSURE: 55 MMHG | OXYGEN SATURATION: 100 % | TEMPERATURE: 98 F | HEART RATE: 78 BPM | SYSTOLIC BLOOD PRESSURE: 116 MMHG

## 2024-09-09 DIAGNOSIS — A69.22 ACUTE LYME DISEASE WITH NEUROLOGICAL DISEASE: Primary | ICD-10-CM

## 2024-09-09 LAB
ALBUMIN SERPL BCP-MCNC: 3.5 G/DL (ref 3.5–5.2)
ALP SERPL-CCNC: 71 U/L (ref 55–135)
ALT SERPL W/O P-5'-P-CCNC: 41 U/L (ref 10–44)
ANION GAP SERPL CALC-SCNC: 11 MMOL/L (ref 8–16)
AST SERPL-CCNC: 22 U/L (ref 10–40)
BASOPHILS # BLD AUTO: 0.02 K/UL (ref 0–0.2)
BASOPHILS NFR BLD: 0.3 % (ref 0–1.9)
BILIRUB SERPL-MCNC: 0.2 MG/DL (ref 0.1–1)
BUN SERPL-MCNC: 12 MG/DL (ref 6–20)
CALCIUM SERPL-MCNC: 9.3 MG/DL (ref 8.7–10.5)
CHLORIDE SERPL-SCNC: 103 MMOL/L (ref 95–110)
CO2 SERPL-SCNC: 21 MMOL/L (ref 23–29)
CREAT SERPL-MCNC: 0.8 MG/DL (ref 0.5–1.4)
CRP SERPL-MCNC: 1.9 MG/L (ref 0–8.2)
DIFFERENTIAL METHOD BLD: NORMAL
EOSINOPHIL # BLD AUTO: 0.1 K/UL (ref 0–0.5)
EOSINOPHIL NFR BLD: 1.1 % (ref 0–8)
ERYTHROCYTE [DISTWIDTH] IN BLOOD BY AUTOMATED COUNT: 12.9 % (ref 11.5–14.5)
ERYTHROCYTE [SEDIMENTATION RATE] IN BLOOD BY WESTERGREN METHOD: 13 MM/HR (ref 0–20)
EST. GFR  (NO RACE VARIABLE): >60 ML/MIN/1.73 M^2
GLUCOSE SERPL-MCNC: 77 MG/DL (ref 70–110)
HCT VFR BLD AUTO: 39.1 % (ref 37–48.5)
HGB BLD-MCNC: 12.7 G/DL (ref 12–16)
IMM GRANULOCYTES # BLD AUTO: 0.01 K/UL (ref 0–0.04)
IMM GRANULOCYTES NFR BLD AUTO: 0.1 % (ref 0–0.5)
LYMPHOCYTES # BLD AUTO: 2.1 K/UL (ref 1–4.8)
LYMPHOCYTES NFR BLD: 28.7 % (ref 18–48)
MCH RBC QN AUTO: 28.5 PG (ref 27–31)
MCHC RBC AUTO-ENTMCNC: 32.5 G/DL (ref 32–36)
MCV RBC AUTO: 88 FL (ref 82–98)
MONOCYTES # BLD AUTO: 0.5 K/UL (ref 0.3–1)
MONOCYTES NFR BLD: 6.9 % (ref 4–15)
NEUTROPHILS # BLD AUTO: 4.5 K/UL (ref 1.8–7.7)
NEUTROPHILS NFR BLD: 62.9 % (ref 38–73)
NRBC BLD-RTO: 0 /100 WBC
PLATELET # BLD AUTO: 238 K/UL (ref 150–450)
PMV BLD AUTO: 10.5 FL (ref 9.2–12.9)
POTASSIUM SERPL-SCNC: 4.1 MMOL/L (ref 3.5–5.1)
PROT SERPL-MCNC: 7 G/DL (ref 6–8.4)
RBC # BLD AUTO: 4.46 M/UL (ref 4–5.4)
SODIUM SERPL-SCNC: 135 MMOL/L (ref 136–145)
WBC # BLD AUTO: 7.14 K/UL (ref 3.9–12.7)

## 2024-09-09 PROCEDURE — 86140 C-REACTIVE PROTEIN: CPT | Performed by: INTERNAL MEDICINE

## 2024-09-09 PROCEDURE — 99211 OFF/OP EST MAY X REQ PHY/QHP: CPT | Mod: 25

## 2024-09-09 PROCEDURE — 36591 DRAW BLOOD OFF VENOUS DEVICE: CPT

## 2024-09-09 PROCEDURE — 80053 COMPREHEN METABOLIC PANEL: CPT | Performed by: INTERNAL MEDICINE

## 2024-09-09 PROCEDURE — 85651 RBC SED RATE NONAUTOMATED: CPT | Performed by: INTERNAL MEDICINE

## 2024-09-09 PROCEDURE — 85025 COMPLETE CBC W/AUTO DIFF WBC: CPT | Performed by: INTERNAL MEDICINE

## 2024-09-09 PROCEDURE — 63600175 PHARM REV CODE 636 W HCPCS: Performed by: INTERNAL MEDICINE

## 2024-09-09 RX ORDER — HEPARIN 100 UNIT/ML
500 SYRINGE INTRAVENOUS
OUTPATIENT
Start: 2024-09-16

## 2024-09-09 RX ORDER — SODIUM CHLORIDE 0.9 % (FLUSH) 0.9 %
10 SYRINGE (ML) INJECTION
Status: DISCONTINUED | OUTPATIENT
Start: 2024-09-09 | End: 2024-09-09 | Stop reason: HOSPADM

## 2024-09-09 RX ORDER — SODIUM CHLORIDE 0.9 % (FLUSH) 0.9 %
10 SYRINGE (ML) INJECTION
OUTPATIENT
Start: 2024-09-16

## 2024-09-09 RX ORDER — HEPARIN 100 UNIT/ML
500 SYRINGE INTRAVENOUS
Status: DISCONTINUED | OUTPATIENT
Start: 2024-09-09 | End: 2024-09-09 | Stop reason: HOSPADM

## 2024-09-09 RX ADMIN — HEPARIN 500 UNITS: 100 SYRINGE at 08:09

## 2024-09-09 NOTE — PLAN OF CARE
Problem: Adult Inpatient Plan of Care  Goal: Plan of Care Review  9/9/2024 0832 by Lissy Harley RN  Outcome: Met  9/9/2024 0832 by Lissy Harley RN  Outcome: Progressing  Goal: Patient-Specific Goal (Individualized)  9/9/2024 0832 by Lissy Harley RN  Outcome: Met  9/9/2024 0832 by Lissy Harley RN  Outcome: Progressing  Goal: Absence of Hospital-Acquired Illness or Injury  9/9/2024 0832 by Lissy Harley RN  Outcome: Met  9/9/2024 0832 by Lissy Harley RN  Outcome: Progressing  Goal: Optimal Comfort and Wellbeing  9/9/2024 0832 by Lissy Harley RN  Outcome: Met  9/9/2024 0832 by Lissy Harley RN  Outcome: Progressing  Goal: Readiness for Transition of Care  9/9/2024 0832 by Lissy aHrley RN  Outcome: Met  9/9/2024 0832 by Lissy Harley RN  Outcome: Progressing

## 2024-09-10 NOTE — PROGRESS NOTES
9/10 Reports that everything went well drsg change and lab draw.. No complaints. Everyone was professional and caring.

## 2024-09-16 ENCOUNTER — INFUSION (OUTPATIENT)
Dept: INFUSION THERAPY | Facility: HOSPITAL | Age: 34
End: 2024-09-16
Attending: INTERNAL MEDICINE
Payer: COMMERCIAL

## 2024-09-16 VITALS
RESPIRATION RATE: 16 BRPM | SYSTOLIC BLOOD PRESSURE: 109 MMHG | OXYGEN SATURATION: 100 % | DIASTOLIC BLOOD PRESSURE: 57 MMHG | HEART RATE: 82 BPM | TEMPERATURE: 98 F

## 2024-09-16 DIAGNOSIS — A69.22 ACUTE LYME DISEASE WITH NEUROLOGICAL DISEASE: Primary | ICD-10-CM

## 2024-09-16 PROCEDURE — 99211 OFF/OP EST MAY X REQ PHY/QHP: CPT

## 2024-09-16 RX ORDER — HEPARIN 100 UNIT/ML
500 SYRINGE INTRAVENOUS
Status: DISCONTINUED | OUTPATIENT
Start: 2024-09-16 | End: 2024-09-16 | Stop reason: HOSPADM

## 2024-09-16 RX ORDER — SODIUM CHLORIDE 0.9 % (FLUSH) 0.9 %
10 SYRINGE (ML) INJECTION
OUTPATIENT
Start: 2024-09-23

## 2024-09-16 RX ORDER — HEPARIN 100 UNIT/ML
500 SYRINGE INTRAVENOUS
OUTPATIENT
Start: 2024-09-23

## 2024-09-16 RX ORDER — SODIUM CHLORIDE 0.9 % (FLUSH) 0.9 %
10 SYRINGE (ML) INJECTION
Status: DISCONTINUED | OUTPATIENT
Start: 2024-09-16 | End: 2024-09-16 | Stop reason: HOSPADM

## 2024-09-23 ENCOUNTER — INFUSION (OUTPATIENT)
Dept: INFUSION THERAPY | Facility: HOSPITAL | Age: 34
End: 2024-09-23
Attending: INTERNAL MEDICINE
Payer: COMMERCIAL

## 2024-09-23 VITALS
HEIGHT: 64 IN | TEMPERATURE: 97 F | DIASTOLIC BLOOD PRESSURE: 67 MMHG | BODY MASS INDEX: 26.91 KG/M2 | OXYGEN SATURATION: 99 % | RESPIRATION RATE: 12 BRPM | SYSTOLIC BLOOD PRESSURE: 116 MMHG | WEIGHT: 157.63 LBS | HEART RATE: 76 BPM

## 2024-09-23 DIAGNOSIS — A69.22 ACUTE LYME DISEASE WITH NEUROLOGICAL DISEASE: Primary | ICD-10-CM

## 2024-09-23 LAB
ALBUMIN SERPL BCP-MCNC: 3.5 G/DL (ref 3.5–5.2)
ALP SERPL-CCNC: 79 U/L (ref 55–135)
ALT SERPL W/O P-5'-P-CCNC: 32 U/L (ref 10–44)
ANION GAP SERPL CALC-SCNC: 11 MMOL/L (ref 8–16)
AST SERPL-CCNC: 20 U/L (ref 10–40)
BASOPHILS # BLD AUTO: 0.04 K/UL (ref 0–0.2)
BASOPHILS NFR BLD: 0.7 % (ref 0–1.9)
BILIRUB SERPL-MCNC: 0.3 MG/DL (ref 0.1–1)
BUN SERPL-MCNC: 11 MG/DL (ref 6–20)
CALCIUM SERPL-MCNC: 9.4 MG/DL (ref 8.7–10.5)
CHLORIDE SERPL-SCNC: 104 MMOL/L (ref 95–110)
CO2 SERPL-SCNC: 23 MMOL/L (ref 23–29)
CREAT SERPL-MCNC: 0.8 MG/DL (ref 0.5–1.4)
CRP SERPL-MCNC: 1.9 MG/L (ref 0–8.2)
DIFFERENTIAL METHOD BLD: NORMAL
EOSINOPHIL # BLD AUTO: 0.1 K/UL (ref 0–0.5)
EOSINOPHIL NFR BLD: 1.8 % (ref 0–8)
ERYTHROCYTE [DISTWIDTH] IN BLOOD BY AUTOMATED COUNT: 13.2 % (ref 11.5–14.5)
ERYTHROCYTE [SEDIMENTATION RATE] IN BLOOD BY WESTERGREN METHOD: 6 MM/HR (ref 0–20)
EST. GFR  (NO RACE VARIABLE): >60 ML/MIN/1.73 M^2
GLUCOSE SERPL-MCNC: 81 MG/DL (ref 70–110)
HCT VFR BLD AUTO: 37.2 % (ref 37–48.5)
HGB BLD-MCNC: 12.2 G/DL (ref 12–16)
IMM GRANULOCYTES # BLD AUTO: 0.01 K/UL (ref 0–0.04)
IMM GRANULOCYTES NFR BLD AUTO: 0.2 % (ref 0–0.5)
LYMPHOCYTES # BLD AUTO: 1.9 K/UL (ref 1–4.8)
LYMPHOCYTES NFR BLD: 32.9 % (ref 18–48)
MCH RBC QN AUTO: 28.6 PG (ref 27–31)
MCHC RBC AUTO-ENTMCNC: 32.8 G/DL (ref 32–36)
MCV RBC AUTO: 87 FL (ref 82–98)
MONOCYTES # BLD AUTO: 0.6 K/UL (ref 0.3–1)
MONOCYTES NFR BLD: 11.2 % (ref 4–15)
NEUTROPHILS # BLD AUTO: 3 K/UL (ref 1.8–7.7)
NEUTROPHILS NFR BLD: 53.2 % (ref 38–73)
NRBC BLD-RTO: 0 /100 WBC
PLATELET # BLD AUTO: 281 K/UL (ref 150–450)
PMV BLD AUTO: 10.4 FL (ref 9.2–12.9)
POTASSIUM SERPL-SCNC: 4 MMOL/L (ref 3.5–5.1)
PROT SERPL-MCNC: 6.6 G/DL (ref 6–8.4)
RBC # BLD AUTO: 4.27 M/UL (ref 4–5.4)
SODIUM SERPL-SCNC: 138 MMOL/L (ref 136–145)
WBC # BLD AUTO: 5.69 K/UL (ref 3.9–12.7)

## 2024-09-23 PROCEDURE — 99211 OFF/OP EST MAY X REQ PHY/QHP: CPT | Mod: 25

## 2024-09-23 PROCEDURE — 80053 COMPREHEN METABOLIC PANEL: CPT | Performed by: INTERNAL MEDICINE

## 2024-09-23 PROCEDURE — 85025 COMPLETE CBC W/AUTO DIFF WBC: CPT | Performed by: INTERNAL MEDICINE

## 2024-09-23 PROCEDURE — 85651 RBC SED RATE NONAUTOMATED: CPT | Performed by: INTERNAL MEDICINE

## 2024-09-23 PROCEDURE — 86140 C-REACTIVE PROTEIN: CPT | Performed by: INTERNAL MEDICINE

## 2024-09-23 PROCEDURE — 36591 DRAW BLOOD OFF VENOUS DEVICE: CPT

## 2024-09-23 RX ORDER — SODIUM CHLORIDE 0.9 % (FLUSH) 0.9 %
10 SYRINGE (ML) INJECTION
Status: DISCONTINUED | OUTPATIENT
Start: 2024-09-23 | End: 2024-09-23 | Stop reason: HOSPADM

## 2024-09-23 RX ORDER — HEPARIN 100 UNIT/ML
500 SYRINGE INTRAVENOUS
Status: CANCELLED | OUTPATIENT
Start: 2024-09-30

## 2024-09-23 RX ORDER — SODIUM CHLORIDE 0.9 % (FLUSH) 0.9 %
10 SYRINGE (ML) INJECTION
Status: CANCELLED | OUTPATIENT
Start: 2024-09-30

## 2024-09-23 RX ORDER — HEPARIN 100 UNIT/ML
500 SYRINGE INTRAVENOUS
Status: DISCONTINUED | OUTPATIENT
Start: 2024-09-23 | End: 2024-09-23 | Stop reason: HOSPADM

## 2024-09-27 ENCOUNTER — INFUSION (OUTPATIENT)
Dept: INFUSION THERAPY | Facility: HOSPITAL | Age: 34
End: 2024-09-27
Attending: INTERNAL MEDICINE
Payer: COMMERCIAL

## 2024-09-27 DIAGNOSIS — A69.22 ACUTE LYME DISEASE WITH NEUROLOGICAL DISEASE: Primary | ICD-10-CM

## 2024-09-27 PROCEDURE — 96523 IRRIG DRUG DELIVERY DEVICE: CPT

## 2024-09-27 PROCEDURE — 25000003 PHARM REV CODE 250: Performed by: INTERNAL MEDICINE

## 2024-09-27 PROCEDURE — A4216 STERILE WATER/SALINE, 10 ML: HCPCS | Performed by: INTERNAL MEDICINE

## 2024-09-27 RX ORDER — HEPARIN 100 UNIT/ML
500 SYRINGE INTRAVENOUS
OUTPATIENT
Start: 2024-09-30

## 2024-09-27 RX ORDER — SODIUM CHLORIDE 0.9 % (FLUSH) 0.9 %
10 SYRINGE (ML) INJECTION
Status: DISCONTINUED | OUTPATIENT
Start: 2024-09-27 | End: 2024-09-27 | Stop reason: HOSPADM

## 2024-09-27 RX ORDER — SODIUM CHLORIDE 0.9 % (FLUSH) 0.9 %
10 SYRINGE (ML) INJECTION
OUTPATIENT
Start: 2024-09-30

## 2024-09-27 RX ADMIN — Medication 10 ML: at 03:09

## 2024-09-27 NOTE — PROGRESS NOTES
"  Pt with complaint of "hard to flush line at home". Pt with PICC line placed in Right Upper Arm.  Accessed with NS and flushed without difficulty, Blood return noted on line #1.  Line #2 accessed with NS and flushed without difficulty, blood return noted on line #2.  Pt will return to clinic on Tuesday, 10/1/24 for Line care, dressing change and labs.  amr  "

## 2024-09-27 NOTE — PLAN OF CARE
Problem: Adult Inpatient Plan of Care  Goal: Optimal Comfort and Wellbeing  Outcome: Progressing  Intervention: Provide Person-Centered Care  Flowsheets (Taken 9/27/2024 1617)  Trust Relationship/Rapport:   care explained   thoughts/feelings acknowledged   choices provided   emotional support provided   empathic listening provided   questions answered   questions encouraged   reassurance provided

## 2024-10-01 ENCOUNTER — INFUSION (OUTPATIENT)
Dept: INFUSION THERAPY | Facility: HOSPITAL | Age: 34
End: 2024-10-01
Attending: INTERNAL MEDICINE
Payer: COMMERCIAL

## 2024-10-01 VITALS
OXYGEN SATURATION: 99 % | TEMPERATURE: 98 F | SYSTOLIC BLOOD PRESSURE: 109 MMHG | DIASTOLIC BLOOD PRESSURE: 55 MMHG | HEART RATE: 79 BPM

## 2024-10-01 DIAGNOSIS — A69.22 ACUTE LYME DISEASE WITH NEUROLOGICAL DISEASE: Primary | ICD-10-CM

## 2024-10-01 LAB
ALBUMIN SERPL BCP-MCNC: 3.4 G/DL (ref 3.5–5.2)
ALP SERPL-CCNC: 76 U/L (ref 55–135)
ALT SERPL W/O P-5'-P-CCNC: 30 U/L (ref 10–44)
ANION GAP SERPL CALC-SCNC: 9 MMOL/L (ref 8–16)
AST SERPL-CCNC: 17 U/L (ref 10–40)
BASOPHILS # BLD AUTO: 0.03 K/UL (ref 0–0.2)
BASOPHILS NFR BLD: 0.5 % (ref 0–1.9)
BILIRUB SERPL-MCNC: 0.2 MG/DL (ref 0.1–1)
BUN SERPL-MCNC: 12 MG/DL (ref 6–20)
CALCIUM SERPL-MCNC: 8.9 MG/DL (ref 8.7–10.5)
CHLORIDE SERPL-SCNC: 107 MMOL/L (ref 95–110)
CO2 SERPL-SCNC: 20 MMOL/L (ref 23–29)
CREAT SERPL-MCNC: 0.8 MG/DL (ref 0.5–1.4)
CRP SERPL-MCNC: 2.7 MG/L (ref 0–8.2)
DIFFERENTIAL METHOD BLD: NORMAL
EOSINOPHIL # BLD AUTO: 0.1 K/UL (ref 0–0.5)
EOSINOPHIL NFR BLD: 2.3 % (ref 0–8)
ERYTHROCYTE [DISTWIDTH] IN BLOOD BY AUTOMATED COUNT: 13 % (ref 11.5–14.5)
ERYTHROCYTE [SEDIMENTATION RATE] IN BLOOD BY WESTERGREN METHOD: 15 MM/HR (ref 0–20)
EST. GFR  (NO RACE VARIABLE): >60 ML/MIN/1.73 M^2
GLUCOSE SERPL-MCNC: 88 MG/DL (ref 70–110)
HCT VFR BLD AUTO: 37.4 % (ref 37–48.5)
HGB BLD-MCNC: 12.3 G/DL (ref 12–16)
IMM GRANULOCYTES # BLD AUTO: 0.02 K/UL (ref 0–0.04)
IMM GRANULOCYTES NFR BLD AUTO: 0.3 % (ref 0–0.5)
LYMPHOCYTES # BLD AUTO: 1.7 K/UL (ref 1–4.8)
LYMPHOCYTES NFR BLD: 27.9 % (ref 18–48)
MCH RBC QN AUTO: 28.7 PG (ref 27–31)
MCHC RBC AUTO-ENTMCNC: 32.9 G/DL (ref 32–36)
MCV RBC AUTO: 87 FL (ref 82–98)
MONOCYTES # BLD AUTO: 0.4 K/UL (ref 0.3–1)
MONOCYTES NFR BLD: 7.1 % (ref 4–15)
NEUTROPHILS # BLD AUTO: 3.8 K/UL (ref 1.8–7.7)
NEUTROPHILS NFR BLD: 61.9 % (ref 38–73)
NRBC BLD-RTO: 0 /100 WBC
PLATELET # BLD AUTO: 248 K/UL (ref 150–450)
PMV BLD AUTO: 10.3 FL (ref 9.2–12.9)
POTASSIUM SERPL-SCNC: 4.5 MMOL/L (ref 3.5–5.1)
PROT SERPL-MCNC: 6.8 G/DL (ref 6–8.4)
RBC # BLD AUTO: 4.29 M/UL (ref 4–5.4)
SODIUM SERPL-SCNC: 136 MMOL/L (ref 136–145)
WBC # BLD AUTO: 6.19 K/UL (ref 3.9–12.7)

## 2024-10-01 PROCEDURE — 85025 COMPLETE CBC W/AUTO DIFF WBC: CPT | Performed by: INTERNAL MEDICINE

## 2024-10-01 PROCEDURE — 86140 C-REACTIVE PROTEIN: CPT | Performed by: INTERNAL MEDICINE

## 2024-10-01 PROCEDURE — 85651 RBC SED RATE NONAUTOMATED: CPT | Performed by: INTERNAL MEDICINE

## 2024-10-01 PROCEDURE — 36591 DRAW BLOOD OFF VENOUS DEVICE: CPT

## 2024-10-01 PROCEDURE — 99211 OFF/OP EST MAY X REQ PHY/QHP: CPT | Mod: 25

## 2024-10-01 PROCEDURE — 80053 COMPREHEN METABOLIC PANEL: CPT | Performed by: INTERNAL MEDICINE

## 2024-10-01 RX ORDER — SODIUM CHLORIDE 0.9 % (FLUSH) 0.9 %
10 SYRINGE (ML) INJECTION
OUTPATIENT
Start: 2024-10-07

## 2024-10-01 RX ORDER — SODIUM CHLORIDE 0.9 % (FLUSH) 0.9 %
10 SYRINGE (ML) INJECTION
Status: DISCONTINUED | OUTPATIENT
Start: 2024-10-01 | End: 2024-10-01 | Stop reason: HOSPADM

## 2024-10-01 RX ORDER — HEPARIN 100 UNIT/ML
500 SYRINGE INTRAVENOUS
OUTPATIENT
Start: 2024-10-07

## 2024-10-01 RX ORDER — HEPARIN 100 UNIT/ML
500 SYRINGE INTRAVENOUS
Status: DISCONTINUED | OUTPATIENT
Start: 2024-10-01 | End: 2024-10-01 | Stop reason: HOSPADM

## 2024-10-01 NOTE — PLAN OF CARE
Problem: Adult Inpatient Plan of Care  Goal: Plan of Care Review  10/1/2024 0840 by Lissy Harley RN  Outcome: Met  10/1/2024 0840 by Lissy Harley RN  Outcome: Progressing  Goal: Patient-Specific Goal (Individualized)  10/1/2024 0840 by Lissy Harley RN  Outcome: Met  10/1/2024 0840 by Lissy Harley RN  Outcome: Progressing  Goal: Absence of Hospital-Acquired Illness or Injury  10/1/2024 0840 by Lissy Harley RN  Outcome: Met  10/1/2024 0840 by Lissy Harley RN  Outcome: Progressing  Goal: Optimal Comfort and Wellbeing  10/1/2024 0840 by Lissy Harley RN  Outcome: Met  10/1/2024 0840 by Lissy Harley RN  Outcome: Progressing  Goal: Readiness for Transition of Care  10/1/2024 0840 by Lissy Harley RN  Outcome: Met  10/1/2024 0840 by Lissy Harley RN  Outcome: Progressing

## 2024-10-07 ENCOUNTER — INFUSION (OUTPATIENT)
Dept: INFUSION THERAPY | Facility: HOSPITAL | Age: 34
End: 2024-10-07
Attending: INTERNAL MEDICINE
Payer: COMMERCIAL

## 2024-10-07 VITALS
TEMPERATURE: 98 F | OXYGEN SATURATION: 99 % | DIASTOLIC BLOOD PRESSURE: 60 MMHG | SYSTOLIC BLOOD PRESSURE: 127 MMHG | HEART RATE: 76 BPM

## 2024-10-07 DIAGNOSIS — A69.22 ACUTE LYME DISEASE WITH NEUROLOGICAL DISEASE: Primary | ICD-10-CM

## 2024-10-07 LAB
ALBUMIN SERPL BCP-MCNC: 3.3 G/DL (ref 3.5–5.2)
ALP SERPL-CCNC: 75 U/L (ref 55–135)
ALT SERPL W/O P-5'-P-CCNC: 26 U/L (ref 10–44)
ANION GAP SERPL CALC-SCNC: 10 MMOL/L (ref 8–16)
AST SERPL-CCNC: 14 U/L (ref 10–40)
BASOPHILS # BLD AUTO: 0.06 K/UL (ref 0–0.2)
BASOPHILS NFR BLD: 0.7 % (ref 0–1.9)
BILIRUB SERPL-MCNC: 0.2 MG/DL (ref 0.1–1)
BUN SERPL-MCNC: 15 MG/DL (ref 6–20)
CALCIUM SERPL-MCNC: 9.4 MG/DL (ref 8.7–10.5)
CHLORIDE SERPL-SCNC: 104 MMOL/L (ref 95–110)
CO2 SERPL-SCNC: 21 MMOL/L (ref 23–29)
CREAT SERPL-MCNC: 0.8 MG/DL (ref 0.5–1.4)
CRP SERPL-MCNC: 1.6 MG/L (ref 0–8.2)
DIFFERENTIAL METHOD BLD: NORMAL
EOSINOPHIL # BLD AUTO: 0.2 K/UL (ref 0–0.5)
EOSINOPHIL NFR BLD: 2 % (ref 0–8)
ERYTHROCYTE [DISTWIDTH] IN BLOOD BY AUTOMATED COUNT: 13 % (ref 11.5–14.5)
ERYTHROCYTE [SEDIMENTATION RATE] IN BLOOD BY WESTERGREN METHOD: 8 MM/HR (ref 0–20)
EST. GFR  (NO RACE VARIABLE): >60 ML/MIN/1.73 M^2
GLUCOSE SERPL-MCNC: 94 MG/DL (ref 70–110)
HCT VFR BLD AUTO: 37.5 % (ref 37–48.5)
HGB BLD-MCNC: 12.5 G/DL (ref 12–16)
IMM GRANULOCYTES # BLD AUTO: 0.03 K/UL (ref 0–0.04)
IMM GRANULOCYTES NFR BLD AUTO: 0.3 % (ref 0–0.5)
LYMPHOCYTES # BLD AUTO: 1.9 K/UL (ref 1–4.8)
LYMPHOCYTES NFR BLD: 20.9 % (ref 18–48)
MCH RBC QN AUTO: 29.2 PG (ref 27–31)
MCHC RBC AUTO-ENTMCNC: 33.3 G/DL (ref 32–36)
MCV RBC AUTO: 88 FL (ref 82–98)
MONOCYTES # BLD AUTO: 0.6 K/UL (ref 0.3–1)
MONOCYTES NFR BLD: 6.2 % (ref 4–15)
NEUTROPHILS # BLD AUTO: 6.4 K/UL (ref 1.8–7.7)
NEUTROPHILS NFR BLD: 69.9 % (ref 38–73)
NRBC BLD-RTO: 0 /100 WBC
PLATELET # BLD AUTO: 256 K/UL (ref 150–450)
PMV BLD AUTO: 10.2 FL (ref 9.2–12.9)
POTASSIUM SERPL-SCNC: 3.8 MMOL/L (ref 3.5–5.1)
PROT SERPL-MCNC: 6.8 G/DL (ref 6–8.4)
RBC # BLD AUTO: 4.28 M/UL (ref 4–5.4)
SODIUM SERPL-SCNC: 135 MMOL/L (ref 136–145)
WBC # BLD AUTO: 9.16 K/UL (ref 3.9–12.7)

## 2024-10-07 PROCEDURE — 85025 COMPLETE CBC W/AUTO DIFF WBC: CPT | Performed by: INTERNAL MEDICINE

## 2024-10-07 PROCEDURE — 86140 C-REACTIVE PROTEIN: CPT | Performed by: INTERNAL MEDICINE

## 2024-10-07 PROCEDURE — 85651 RBC SED RATE NONAUTOMATED: CPT | Performed by: INTERNAL MEDICINE

## 2024-10-07 PROCEDURE — 36591 DRAW BLOOD OFF VENOUS DEVICE: CPT

## 2024-10-07 PROCEDURE — 80053 COMPREHEN METABOLIC PANEL: CPT | Performed by: INTERNAL MEDICINE

## 2024-10-07 PROCEDURE — 99211 OFF/OP EST MAY X REQ PHY/QHP: CPT | Mod: 25

## 2024-10-07 RX ORDER — SODIUM CHLORIDE 0.9 % (FLUSH) 0.9 %
10 SYRINGE (ML) INJECTION
OUTPATIENT
Start: 2024-10-14

## 2024-10-07 RX ORDER — HEPARIN 100 UNIT/ML
500 SYRINGE INTRAVENOUS
Status: DISCONTINUED | OUTPATIENT
Start: 2024-10-07 | End: 2024-10-07 | Stop reason: HOSPADM

## 2024-10-07 RX ORDER — SODIUM CHLORIDE 0.9 % (FLUSH) 0.9 %
10 SYRINGE (ML) INJECTION
Status: DISCONTINUED | OUTPATIENT
Start: 2024-10-07 | End: 2024-10-07 | Stop reason: HOSPADM

## 2024-10-07 RX ORDER — HEPARIN 100 UNIT/ML
500 SYRINGE INTRAVENOUS
OUTPATIENT
Start: 2024-10-14

## 2024-10-07 NOTE — PLAN OF CARE
Problem: Adult Inpatient Plan of Care  Goal: Plan of Care Review  10/7/2024 0834 by Lissy Harley RN  Outcome: Met  10/7/2024 0834 by Lissy Harley RN  Outcome: Progressing  Goal: Patient-Specific Goal (Individualized)  10/7/2024 0834 by Lissy Harley RN  Outcome: Met  10/7/2024 0834 by Lissy Harley RN  Outcome: Progressing  Goal: Absence of Hospital-Acquired Illness or Injury  10/7/2024 0834 by Lissy Harley RN  Outcome: Met  10/7/2024 0834 by Lissy Harley RN  Outcome: Progressing  Goal: Optimal Comfort and Wellbeing  10/7/2024 0834 by Lissy Harley RN  Outcome: Met  10/7/2024 0834 by Lissy Harley RN  Outcome: Progressing  Goal: Readiness for Transition of Care  10/7/2024 0834 by Lissy Harley RN  Outcome: Met  10/7/2024 0834 by Lissy Harley RN  Outcome: Progressing

## 2024-10-14 ENCOUNTER — INFUSION (OUTPATIENT)
Dept: INFUSION THERAPY | Facility: HOSPITAL | Age: 34
End: 2024-10-14
Attending: INTERNAL MEDICINE
Payer: COMMERCIAL

## 2024-10-14 VITALS
RESPIRATION RATE: 16 BRPM | DIASTOLIC BLOOD PRESSURE: 57 MMHG | WEIGHT: 156 LBS | HEIGHT: 64 IN | HEART RATE: 75 BPM | BODY MASS INDEX: 26.63 KG/M2 | OXYGEN SATURATION: 100 % | SYSTOLIC BLOOD PRESSURE: 111 MMHG | TEMPERATURE: 98 F

## 2024-10-14 DIAGNOSIS — A69.22 ACUTE LYME DISEASE WITH NEUROLOGICAL DISEASE: Primary | ICD-10-CM

## 2024-10-14 LAB
ALBUMIN SERPL BCP-MCNC: 3.3 G/DL (ref 3.5–5.2)
ALP SERPL-CCNC: 66 U/L (ref 55–135)
ALT SERPL W/O P-5'-P-CCNC: 31 U/L (ref 10–44)
ANION GAP SERPL CALC-SCNC: 11 MMOL/L (ref 8–16)
AST SERPL-CCNC: 19 U/L (ref 10–40)
BILIRUB SERPL-MCNC: 0.3 MG/DL (ref 0.1–1)
BUN SERPL-MCNC: 14 MG/DL (ref 6–20)
CALCIUM SERPL-MCNC: 9.3 MG/DL (ref 8.7–10.5)
CHLORIDE SERPL-SCNC: 105 MMOL/L (ref 95–110)
CO2 SERPL-SCNC: 20 MMOL/L (ref 23–29)
CREAT SERPL-MCNC: 0.8 MG/DL (ref 0.5–1.4)
CRP SERPL-MCNC: 1.5 MG/L (ref 0–8.2)
ERYTHROCYTE [SEDIMENTATION RATE] IN BLOOD BY WESTERGREN METHOD: 9 MM/HR (ref 0–20)
EST. GFR  (NO RACE VARIABLE): >60 ML/MIN/1.73 M^2
GLUCOSE SERPL-MCNC: 84 MG/DL (ref 70–110)
POTASSIUM SERPL-SCNC: 3.9 MMOL/L (ref 3.5–5.1)
PROT SERPL-MCNC: 6.6 G/DL (ref 6–8.4)
SODIUM SERPL-SCNC: 136 MMOL/L (ref 136–145)

## 2024-10-14 PROCEDURE — 80053 COMPREHEN METABOLIC PANEL: CPT | Performed by: INTERNAL MEDICINE

## 2024-10-14 PROCEDURE — 25000003 PHARM REV CODE 250: Performed by: INTERNAL MEDICINE

## 2024-10-14 PROCEDURE — 63600175 PHARM REV CODE 636 W HCPCS: Performed by: INTERNAL MEDICINE

## 2024-10-14 PROCEDURE — 36592 COLLECT BLOOD FROM PICC: CPT

## 2024-10-14 PROCEDURE — 99211 OFF/OP EST MAY X REQ PHY/QHP: CPT | Mod: 25

## 2024-10-14 PROCEDURE — 85651 RBC SED RATE NONAUTOMATED: CPT | Performed by: INTERNAL MEDICINE

## 2024-10-14 PROCEDURE — 86140 C-REACTIVE PROTEIN: CPT | Performed by: INTERNAL MEDICINE

## 2024-10-14 PROCEDURE — A4216 STERILE WATER/SALINE, 10 ML: HCPCS | Performed by: INTERNAL MEDICINE

## 2024-10-14 RX ORDER — HEPARIN 100 UNIT/ML
500 SYRINGE INTRAVENOUS
Status: DISCONTINUED | OUTPATIENT
Start: 2024-10-14 | End: 2024-10-14 | Stop reason: HOSPADM

## 2024-10-14 RX ORDER — SODIUM CHLORIDE 0.9 % (FLUSH) 0.9 %
10 SYRINGE (ML) INJECTION
OUTPATIENT
Start: 2024-10-21

## 2024-10-14 RX ORDER — HEPARIN 100 UNIT/ML
500 SYRINGE INTRAVENOUS
OUTPATIENT
Start: 2024-10-21

## 2024-10-14 RX ORDER — SODIUM CHLORIDE 0.9 % (FLUSH) 0.9 %
10 SYRINGE (ML) INJECTION
Status: DISCONTINUED | OUTPATIENT
Start: 2024-10-14 | End: 2024-10-14 | Stop reason: HOSPADM

## 2024-10-14 RX ADMIN — HEPARIN 500 UNITS: 100 SYRINGE at 08:10

## 2024-10-14 RX ADMIN — Medication 10 ML: at 08:10

## 2024-10-14 NOTE — PLAN OF CARE
Problem: Adult Inpatient Plan of Care  Goal: Optimal Comfort and Wellbeing  Outcome: Progressing  Intervention: Provide Person-Centered Care  Flowsheets (Taken 10/14/2024 0662)  Trust Relationship/Rapport:   care explained   choices provided   emotional support provided   empathic listening provided   questions answered   questions encouraged   thoughts/feelings acknowledged   reassurance provided

## 2024-10-21 ENCOUNTER — INFUSION (OUTPATIENT)
Dept: INFUSION THERAPY | Facility: HOSPITAL | Age: 34
End: 2024-10-21
Attending: INTERNAL MEDICINE
Payer: COMMERCIAL

## 2024-10-21 VITALS
DIASTOLIC BLOOD PRESSURE: 76 MMHG | BODY MASS INDEX: 26.74 KG/M2 | HEIGHT: 64 IN | TEMPERATURE: 97 F | SYSTOLIC BLOOD PRESSURE: 121 MMHG | WEIGHT: 156.63 LBS | HEART RATE: 66 BPM | OXYGEN SATURATION: 99 % | RESPIRATION RATE: 16 BRPM

## 2024-10-21 DIAGNOSIS — A69.22 ACUTE LYME DISEASE WITH NEUROLOGICAL DISEASE: Primary | ICD-10-CM

## 2024-10-21 LAB
ALBUMIN SERPL BCP-MCNC: 3.4 G/DL (ref 3.5–5.2)
ALP SERPL-CCNC: 71 U/L (ref 40–150)
ALT SERPL W/O P-5'-P-CCNC: 45 U/L (ref 10–44)
ANION GAP SERPL CALC-SCNC: 12 MMOL/L (ref 8–16)
AST SERPL-CCNC: 26 U/L (ref 10–40)
BASOPHILS # BLD AUTO: 0.02 K/UL (ref 0–0.2)
BASOPHILS NFR BLD: 0.4 % (ref 0–1.9)
BILIRUB SERPL-MCNC: 0.2 MG/DL (ref 0.1–1)
BUN SERPL-MCNC: 14 MG/DL (ref 6–20)
CALCIUM SERPL-MCNC: 9.2 MG/DL (ref 8.7–10.5)
CHLORIDE SERPL-SCNC: 105 MMOL/L (ref 95–110)
CO2 SERPL-SCNC: 20 MMOL/L (ref 23–29)
CREAT SERPL-MCNC: 0.7 MG/DL (ref 0.5–1.4)
CRP SERPL-MCNC: 1.7 MG/L (ref 0–8.2)
DIFFERENTIAL METHOD BLD: NORMAL
EOSINOPHIL # BLD AUTO: 0.1 K/UL (ref 0–0.5)
EOSINOPHIL NFR BLD: 2.7 % (ref 0–8)
ERYTHROCYTE [DISTWIDTH] IN BLOOD BY AUTOMATED COUNT: 13 % (ref 11.5–14.5)
ERYTHROCYTE [SEDIMENTATION RATE] IN BLOOD BY WESTERGREN METHOD: 5 MM/HR (ref 0–20)
EST. GFR  (NO RACE VARIABLE): >60 ML/MIN/1.73 M^2
GLUCOSE SERPL-MCNC: 100 MG/DL (ref 70–110)
HCT VFR BLD AUTO: 37 % (ref 37–48.5)
HGB BLD-MCNC: 12.2 G/DL (ref 12–16)
IMM GRANULOCYTES # BLD AUTO: 0.02 K/UL (ref 0–0.04)
IMM GRANULOCYTES NFR BLD AUTO: 0.4 % (ref 0–0.5)
LYMPHOCYTES # BLD AUTO: 1.9 K/UL (ref 1–4.8)
LYMPHOCYTES NFR BLD: 36.5 % (ref 18–48)
MCH RBC QN AUTO: 28.8 PG (ref 27–31)
MCHC RBC AUTO-ENTMCNC: 33 G/DL (ref 32–36)
MCV RBC AUTO: 88 FL (ref 82–98)
MONOCYTES # BLD AUTO: 0.4 K/UL (ref 0.3–1)
MONOCYTES NFR BLD: 7.7 % (ref 4–15)
NEUTROPHILS # BLD AUTO: 2.7 K/UL (ref 1.8–7.7)
NEUTROPHILS NFR BLD: 52.3 % (ref 38–73)
NRBC BLD-RTO: 0 /100 WBC
PLATELET # BLD AUTO: 231 K/UL (ref 150–450)
PMV BLD AUTO: 10.6 FL (ref 9.2–12.9)
POTASSIUM SERPL-SCNC: 3.9 MMOL/L (ref 3.5–5.1)
PROT SERPL-MCNC: 6.7 G/DL (ref 6–8.4)
RBC # BLD AUTO: 4.23 M/UL (ref 4–5.4)
SODIUM SERPL-SCNC: 137 MMOL/L (ref 136–145)
WBC # BLD AUTO: 5.2 K/UL (ref 3.9–12.7)

## 2024-10-21 PROCEDURE — 85025 COMPLETE CBC W/AUTO DIFF WBC: CPT | Performed by: INTERNAL MEDICINE

## 2024-10-21 PROCEDURE — A4216 STERILE WATER/SALINE, 10 ML: HCPCS | Performed by: INTERNAL MEDICINE

## 2024-10-21 PROCEDURE — 85651 RBC SED RATE NONAUTOMATED: CPT | Performed by: INTERNAL MEDICINE

## 2024-10-21 PROCEDURE — 86140 C-REACTIVE PROTEIN: CPT | Performed by: INTERNAL MEDICINE

## 2024-10-21 PROCEDURE — 25000003 PHARM REV CODE 250: Performed by: INTERNAL MEDICINE

## 2024-10-21 PROCEDURE — 80053 COMPREHEN METABOLIC PANEL: CPT | Performed by: INTERNAL MEDICINE

## 2024-10-21 PROCEDURE — 99211 OFF/OP EST MAY X REQ PHY/QHP: CPT | Mod: 25

## 2024-10-21 PROCEDURE — 36592 COLLECT BLOOD FROM PICC: CPT

## 2024-10-21 RX ORDER — SODIUM CHLORIDE 0.9 % (FLUSH) 0.9 %
10 SYRINGE (ML) INJECTION
Status: CANCELLED | OUTPATIENT
Start: 2024-10-28

## 2024-10-21 RX ORDER — HEPARIN 100 UNIT/ML
500 SYRINGE INTRAVENOUS
OUTPATIENT
Start: 2024-10-28

## 2024-10-21 RX ORDER — SODIUM CHLORIDE 0.9 % (FLUSH) 0.9 %
10 SYRINGE (ML) INJECTION
Status: DISCONTINUED | OUTPATIENT
Start: 2024-10-21 | End: 2024-10-21 | Stop reason: HOSPADM

## 2024-10-21 RX ADMIN — Medication 10 ML: at 08:10

## 2024-10-21 NOTE — PLAN OF CARE
Problem: Adult Inpatient Plan of Care  Goal: Optimal Comfort and Wellbeing  Outcome: Progressing  Intervention: Provide Person-Centered Care  Flowsheets (Taken 10/21/2024 4393)  Trust Relationship/Rapport:   care explained   thoughts/feelings acknowledged   choices provided   emotional support provided   empathic listening provided   questions answered   questions encouraged   reassurance provided      No. ADRIEN screening performed.  STOP BANG Legend: 0-2 = LOW Risk; 3-4 = INTERMEDIATE Risk; 5-8 = HIGH Risk

## 2024-10-25 ENCOUNTER — INFUSION (OUTPATIENT)
Dept: INFUSION THERAPY | Facility: HOSPITAL | Age: 34
End: 2024-10-25
Attending: INTERNAL MEDICINE
Payer: COMMERCIAL

## 2024-10-25 VITALS
RESPIRATION RATE: 16 BRPM | DIASTOLIC BLOOD PRESSURE: 63 MMHG | OXYGEN SATURATION: 98 % | HEART RATE: 88 BPM | SYSTOLIC BLOOD PRESSURE: 113 MMHG | WEIGHT: 123.38 LBS | TEMPERATURE: 98 F | BODY MASS INDEX: 21.17 KG/M2

## 2024-10-25 DIAGNOSIS — A69.22 ACUTE LYME DISEASE WITH NEUROLOGICAL DISEASE: Primary | ICD-10-CM

## 2024-10-25 PROCEDURE — 99211 OFF/OP EST MAY X REQ PHY/QHP: CPT

## 2024-10-25 RX ORDER — SODIUM CHLORIDE 0.9 % (FLUSH) 0.9 %
10 SYRINGE (ML) INJECTION
Status: DISCONTINUED | OUTPATIENT
Start: 2024-10-25 | End: 2024-10-25

## 2024-10-25 RX ORDER — SODIUM CHLORIDE 0.9 % (FLUSH) 0.9 %
10 SYRINGE (ML) INJECTION
Status: CANCELLED | OUTPATIENT
Start: 2024-10-28

## 2024-10-25 RX ORDER — HEPARIN 100 UNIT/ML
500 SYRINGE INTRAVENOUS
Status: CANCELLED | OUTPATIENT
Start: 2024-10-28

## 2024-10-25 NOTE — PROGRESS NOTES
Pt arrived to to chair, ambulated on own.  VS stable. PICC  d/c'd. Site free and clear of redness, swelling, drainage.  Bandage applied. Pt observed for 20 min. Pt ambulated out of unit on own.  Amr

## 2024-10-25 NOTE — PLAN OF CARE
Problem: Adult Inpatient Plan of Care  Goal: Plan of Care Review  Outcome: Met  Goal: Patient-Specific Goal (Individualized)  Outcome: Met  Goal: Absence of Hospital-Acquired Illness or Injury  Outcome: Met  Goal: Optimal Comfort and Wellbeing  Outcome: Met  Intervention: Provide Person-Centered Care  Flowsheets (Taken 10/25/2024 3612)  Trust Relationship/Rapport:   care explained   thoughts/feelings acknowledged   emotional support provided   empathic listening provided   choices provided   questions answered   questions encouraged   reassurance provided  Goal: Readiness for Transition of Care  Outcome: Met

## (undated) DEVICE — TROCAR ENDOPATH XCEL 11MM 10CM

## (undated) DEVICE — APPLIER CLIP ENDO MED/LG 10MM

## (undated) DEVICE — KIT ANTIFOG

## (undated) DEVICE — SUT 0 VICRYL / UR6 (J603)

## (undated) DEVICE — SLEEVE SCD EXPRESS KNEE MEDIUM

## (undated) DEVICE — APPLICATOR CHLORAPREP ORN 26ML

## (undated) DEVICE — LINER SUCTION 3000CC

## (undated) DEVICE — TROCAR ENDOPATH XCEL 5X100MM

## (undated) DEVICE — GLOVE SURG ULTRA TOUCH 7

## (undated) DEVICE — KIT PROCEDURE STER INLET CLOSU

## (undated) DEVICE — ADHESIVE DERMABOND ADVANCED

## (undated) DEVICE — SYR 10CC LUER LOCK

## (undated) DEVICE — SUT 3-0 VICRYL / SH (J416)

## (undated) DEVICE — ELECTRODE REM PLYHSV RETURN 9

## (undated) DEVICE — SET TUBE PNEUMOCLEAR SE HI FLO

## (undated) DEVICE — SOL WATER STRL IRR 1000ML

## (undated) DEVICE — NDL INSUF ULTRA VERESS 120MM

## (undated) DEVICE — NDL SAFETY 21G X 1 1/2 ECLPSE

## (undated) DEVICE — SEE MEDLINE ITEM 157117

## (undated) DEVICE — SUT MONOCRYL 4-0 PS-2

## (undated) DEVICE — CANNULA ENDOPATH XCEL 5X100MM

## (undated) DEVICE — SCISSOR 5MMX35CM DIRECT DRIVE

## (undated) DEVICE — TOWEL OR DISP STRL BLUE 4/PK

## (undated) DEVICE — BAG TISS RETRV MONARCH 10MM

## (undated) DEVICE — STRAP OR TABLE 5IN X 72IN

## (undated) DEVICE — PACK CUSTOM ENDO CHOLO SLI

## (undated) DEVICE — DISSECTOR CURVED 5DCD